# Patient Record
Sex: MALE | Race: WHITE | NOT HISPANIC OR LATINO | Employment: FULL TIME | URBAN - METROPOLITAN AREA
[De-identification: names, ages, dates, MRNs, and addresses within clinical notes are randomized per-mention and may not be internally consistent; named-entity substitution may affect disease eponyms.]

---

## 2017-01-06 ENCOUNTER — THERAPY VISIT (OUTPATIENT)
Dept: PHYSICAL THERAPY | Facility: CLINIC | Age: 52
End: 2017-01-06
Payer: COMMERCIAL

## 2017-01-06 DIAGNOSIS — M25.561 RIGHT KNEE PAIN: ICD-10-CM

## 2017-01-06 DIAGNOSIS — Z47.89 AFTERCARE FOLLOWING SURGERY OF THE MUSCULOSKELETAL SYSTEM: Primary | ICD-10-CM

## 2017-01-06 PROCEDURE — 97014 ELECTRIC STIMULATION THERAPY: CPT | Mod: GP | Performed by: PHYSICAL THERAPIST

## 2017-01-06 PROCEDURE — 97140 MANUAL THERAPY 1/> REGIONS: CPT | Mod: GP | Performed by: PHYSICAL THERAPIST

## 2017-01-06 PROCEDURE — 97110 THERAPEUTIC EXERCISES: CPT | Mod: GP | Performed by: PHYSICAL THERAPIST

## 2017-01-13 ENCOUNTER — THERAPY VISIT (OUTPATIENT)
Dept: PHYSICAL THERAPY | Facility: CLINIC | Age: 52
End: 2017-01-13
Payer: COMMERCIAL

## 2017-01-13 DIAGNOSIS — M25.561 RIGHT KNEE PAIN: ICD-10-CM

## 2017-01-13 DIAGNOSIS — Z47.89 AFTERCARE FOLLOWING SURGERY OF THE MUSCULOSKELETAL SYSTEM: Primary | ICD-10-CM

## 2017-01-13 PROCEDURE — 97140 MANUAL THERAPY 1/> REGIONS: CPT | Mod: GP | Performed by: PHYSICAL THERAPIST

## 2017-01-13 PROCEDURE — 97110 THERAPEUTIC EXERCISES: CPT | Mod: GP | Performed by: PHYSICAL THERAPIST

## 2017-01-19 ENCOUNTER — THERAPY VISIT (OUTPATIENT)
Dept: PHYSICAL THERAPY | Facility: CLINIC | Age: 52
End: 2017-01-19
Payer: COMMERCIAL

## 2017-01-19 DIAGNOSIS — Z47.89 AFTERCARE FOLLOWING SURGERY OF THE MUSCULOSKELETAL SYSTEM: Primary | ICD-10-CM

## 2017-01-19 DIAGNOSIS — M25.561 RIGHT KNEE PAIN: ICD-10-CM

## 2017-01-19 PROCEDURE — 97110 THERAPEUTIC EXERCISES: CPT | Mod: GP | Performed by: PHYSICAL THERAPIST

## 2017-01-19 PROCEDURE — 97140 MANUAL THERAPY 1/> REGIONS: CPT | Mod: GP | Performed by: PHYSICAL THERAPIST

## 2017-01-27 ENCOUNTER — THERAPY VISIT (OUTPATIENT)
Dept: PHYSICAL THERAPY | Facility: CLINIC | Age: 52
End: 2017-01-27
Payer: COMMERCIAL

## 2017-01-27 DIAGNOSIS — M25.561 RIGHT KNEE PAIN: ICD-10-CM

## 2017-01-27 DIAGNOSIS — Z47.89 AFTERCARE FOLLOWING SURGERY OF THE MUSCULOSKELETAL SYSTEM: Primary | ICD-10-CM

## 2017-01-27 PROCEDURE — 97140 MANUAL THERAPY 1/> REGIONS: CPT | Mod: GP | Performed by: PHYSICAL THERAPIST

## 2017-01-27 PROCEDURE — 97014 ELECTRIC STIMULATION THERAPY: CPT | Mod: GP | Performed by: PHYSICAL THERAPIST

## 2017-01-27 PROCEDURE — 97110 THERAPEUTIC EXERCISES: CPT | Mod: GP | Performed by: PHYSICAL THERAPIST

## 2017-01-27 NOTE — Clinical Note
[facility] ATHLETIC [facility] PHYSICAL THERAPY  [address]  [facility] [address]  [phone]    2017    Re: Miguel Moya   :   1965  MRN:  [id number]   REFERRING PHYSICIAN:   Lexa Calles    [facility] ATHLETIC [facility] PHYSICAL [facility]  Date of Initial Evaluation:  2016  Visits:     Reason for Referral:     Aftercare following surgery of the musculoskeletal system  Right knee pain  PROGRESS  REPORT  Progress reporting period is  to 2017.     SUBJECTIVE  Subjective changes noted by patient:  .  Subjective: Pt notes that he has minimal symptoms of pain. He is less guarded with ROM.     Current pain level is NA  .     Previous pain level was  NA  .   Changes in function:  Yes (See Goal flowsheet attached for changes in current functional level)  Adverse reaction to treatment or activity: None  OBJECTIVE  Changes noted in objective findings:  Yes,   Objective: PROM L knee extension supine 0, flexion sitting 90. Good quad activation,    ASSESSMENT/PLAN  Updated problem list and treatment plan: Diagnosis 1:  S/P meniscus repair  Decreased ROM/flexibility - manual therapy and therapeutic exercise  Decreased strength - therapeutic exercise and therapeutic activities  Impaired gait - gait training  Impaired muscle performance - neuro re-education  Decreased function - therapeutic activities  STG/LTGs have been met or progress has been made towards goals:  Yes (See Goal flow sheet completed today.)  Assessment of Progress: The patient's condition is improving.  Self Management Plans:  Patient has been instructed in a home treatment program.  I have re-evaluated this patient and find that the nature, scope, duration and intensity of the therapy is appropriate for the medical condition of the patient.  Miguel continues to require the following intervention to meet STG and LTG's:  PT  Recommendations:  Please advise on progression of program    Thank you  for your referral.        Re: Miguel Moya   :   1965    INQUIRIES  Therapist: Anahi Hardy PT, [facility]   [facility] [place] [facility]ICAL [facility]  [address] [place] [address] [id number]  [facility] [address]  Phone: [phone]  Fax: [phone]

## 2017-01-30 NOTE — PROGRESS NOTES
Subjective:    HPI                    Objective:    System    Physical Exam    General     ROS    Assessment/Plan:      PROGRESS  REPORT    Progress reporting period is  to 1-.       SUBJECTIVE  Subjective changes noted by patient:  .  Subjective: Pt notes that he has minimal symptoms of pain. He is less guarded with ROM.     Current pain level is NA  .     Previous pain level was  NA  .   Changes in function:  Yes (See Goal flowsheet attached for changes in current functional level)  Adverse reaction to treatment or activity: None    OBJECTIVE  Changes noted in objective findings:  Yes,   Objective: PROM L knee extension supine 0, flexion sitting 90. Good quad activation,      ASSESSMENT/PLAN  Updated problem list and treatment plan: Diagnosis 1:  S/P meniscus repair  Decreased ROM/flexibility - manual therapy and therapeutic exercise  Decreased strength - therapeutic exercise and therapeutic activities  Impaired gait - gait training  Impaired muscle performance - neuro re-education  Decreased function - therapeutic activities  STG/LTGs have been met or progress has been made towards goals:  Yes (See Goal flow sheet completed today.)  Assessment of Progress: The patient's condition is improving.  Self Management Plans:  Patient has been instructed in a home treatment program.  I have re-evaluated this patient and find that the nature, scope, duration and intensity of the therapy is appropriate for the medical condition of the patient.  Miguel continues to require the following intervention to meet STG and LTG's:  PT    Recommendations:  Please advise on progression of program    Please refer to the daily flowsheet for treatment today, total treatment time and time spent performing 1:1 timed codes.

## 2017-02-06 ENCOUNTER — THERAPY VISIT (OUTPATIENT)
Dept: PHYSICAL THERAPY | Facility: CLINIC | Age: 52
End: 2017-02-06
Payer: COMMERCIAL

## 2017-02-06 DIAGNOSIS — Z47.89 AFTERCARE FOLLOWING SURGERY OF THE MUSCULOSKELETAL SYSTEM: Primary | ICD-10-CM

## 2017-02-06 DIAGNOSIS — M25.561 RIGHT KNEE PAIN: ICD-10-CM

## 2017-02-06 PROCEDURE — 97110 THERAPEUTIC EXERCISES: CPT | Mod: GP | Performed by: PHYSICAL THERAPIST

## 2017-02-06 PROCEDURE — 97112 NEUROMUSCULAR REEDUCATION: CPT | Mod: GP | Performed by: PHYSICAL THERAPIST

## 2017-02-06 PROCEDURE — 97140 MANUAL THERAPY 1/> REGIONS: CPT | Mod: GP | Performed by: PHYSICAL THERAPIST

## 2017-02-13 ENCOUNTER — THERAPY VISIT (OUTPATIENT)
Dept: PHYSICAL THERAPY | Facility: CLINIC | Age: 52
End: 2017-02-13
Payer: COMMERCIAL

## 2017-02-13 DIAGNOSIS — Z47.89 AFTERCARE FOLLOWING SURGERY OF THE MUSCULOSKELETAL SYSTEM: ICD-10-CM

## 2017-02-13 DIAGNOSIS — M25.561 RIGHT KNEE PAIN: ICD-10-CM

## 2017-02-13 PROCEDURE — 97140 MANUAL THERAPY 1/> REGIONS: CPT | Mod: GP | Performed by: PHYSICAL THERAPIST

## 2017-02-13 PROCEDURE — 97110 THERAPEUTIC EXERCISES: CPT | Mod: GP | Performed by: PHYSICAL THERAPIST

## 2017-02-13 PROCEDURE — 97112 NEUROMUSCULAR REEDUCATION: CPT | Mod: GP | Performed by: PHYSICAL THERAPIST

## 2017-02-20 ENCOUNTER — THERAPY VISIT (OUTPATIENT)
Dept: PHYSICAL THERAPY | Facility: CLINIC | Age: 52
End: 2017-02-20
Payer: COMMERCIAL

## 2017-02-20 DIAGNOSIS — Z47.89 AFTERCARE FOLLOWING SURGERY OF THE MUSCULOSKELETAL SYSTEM: ICD-10-CM

## 2017-02-20 DIAGNOSIS — M25.561 RIGHT KNEE PAIN: ICD-10-CM

## 2017-02-20 PROCEDURE — 97110 THERAPEUTIC EXERCISES: CPT | Mod: GP | Performed by: PHYSICAL THERAPIST

## 2017-02-20 PROCEDURE — 97112 NEUROMUSCULAR REEDUCATION: CPT | Mod: GP | Performed by: PHYSICAL THERAPIST

## 2017-02-20 PROCEDURE — 97140 MANUAL THERAPY 1/> REGIONS: CPT | Mod: GP | Performed by: PHYSICAL THERAPIST

## 2017-02-27 ENCOUNTER — THERAPY VISIT (OUTPATIENT)
Dept: PHYSICAL THERAPY | Facility: CLINIC | Age: 52
End: 2017-02-27
Payer: COMMERCIAL

## 2017-02-27 DIAGNOSIS — Z47.89 AFTERCARE FOLLOWING SURGERY OF THE MUSCULOSKELETAL SYSTEM: ICD-10-CM

## 2017-02-27 DIAGNOSIS — M25.561 RIGHT KNEE PAIN: ICD-10-CM

## 2017-02-27 PROCEDURE — 97140 MANUAL THERAPY 1/> REGIONS: CPT | Mod: GP | Performed by: PHYSICAL THERAPIST

## 2017-02-27 PROCEDURE — 97112 NEUROMUSCULAR REEDUCATION: CPT | Mod: GP | Performed by: PHYSICAL THERAPIST

## 2017-02-27 PROCEDURE — 97110 THERAPEUTIC EXERCISES: CPT | Mod: GP | Performed by: PHYSICAL THERAPIST

## 2017-03-06 ENCOUNTER — THERAPY VISIT (OUTPATIENT)
Dept: PHYSICAL THERAPY | Facility: CLINIC | Age: 52
End: 2017-03-06
Payer: COMMERCIAL

## 2017-03-06 DIAGNOSIS — M25.561 RIGHT KNEE PAIN: ICD-10-CM

## 2017-03-06 DIAGNOSIS — Z47.89 AFTERCARE FOLLOWING SURGERY OF THE MUSCULOSKELETAL SYSTEM: ICD-10-CM

## 2017-03-06 PROCEDURE — 97110 THERAPEUTIC EXERCISES: CPT | Mod: GP | Performed by: PHYSICAL THERAPIST

## 2017-03-06 PROCEDURE — 97140 MANUAL THERAPY 1/> REGIONS: CPT | Mod: GP | Performed by: PHYSICAL THERAPIST

## 2017-03-06 PROCEDURE — 97112 NEUROMUSCULAR REEDUCATION: CPT | Mod: GP | Performed by: PHYSICAL THERAPIST

## 2017-03-06 NOTE — MR AVS SNAPSHOT
"              After Visit Summary   3/6/2017    Miguel Moya    MRN: [id number]           Patient Information     Date Of Birth          1965        Visit Information        Provider Department      3/6/2017 4:30 PM Anahi Hardy, PT [place] [facility]        Today's Diagnoses     Aftercare following surgery of the musculoskeletal system        Right knee pain           Follow-ups after your visit        Your next 10 appointments already scheduled     Mar 13, 2017  8:00 AM CDT   AL[facility] Extremity with Anahi Hardy PT   [facility] (AL[facility] [place]  )    [address]  [facility] [place] [id number] [phone]              Who to contact     If you have questions or need follow up information about today's clinic visit or your schedule please contact [place] [facility]AP[facility] directly at [phone].  Normal or non-critical lab and imaging results will be communicated to you by Blooiehart, letter or phone within 4 business days after the clinic has received the results. If you do not hear from us within 7 days, please contact the clinic through Blooiehart or phone. If you have a critical or abnormal lab result, we will notify you by phone as soon as possible.  Submit refill requests through Hemarina or call your pharmacy and they will forward the refill request to us. Please allow 3 business days for your refill to be completed.          Additional Information About Your Visit        Blooiehart Information     Hemarina lets you send messages to your doctor, view your test results, renew your prescriptions, schedule appointments and more. To sign up, go to [email or web address] . Click on \"Log in\" on the left side of the screen, which will take you to the Welcome page. Then click on \"Sign up Now\" on the right side of the page.     You will be asked to enter the access code listed below, as well " as some personal information. Please follow the directions to create your username and password.     Your access code is: [email or web address]  Expires: 2017  4:08 PM     Your access code will  in 90 days. If you need help or a new code, please call your [place] clinic or [phone].        Care EveryWhere ID     This is your Care EveryWhere ID. This could be used by other organizations to access your [place] medical records  [id number]         Blood Pressure from Last 3 Encounters:   12/10/15 143/89   13 100/80    Weight from Last 3 Encounters:   12/10/15 57.2 kg (126 lb)              We Performed the Following     [facility] PROGRESS NOTES REPORT     MANUAL THER TECH,1+REGIONS,EA 15 MIN     NEUROMUSCULAR RE-EDUCATION     THERAPEUTIC EXERCISES        Primary Care Provider Office Phone # Fax #    Heber Gaetano Salazar [phone]       [facility] [address]    [facility] [address]        Thank you!     Thank you for choosing [place] [facility] PHYSICAL THERAPY  for your care. Our goal is always to provide you with excellent care. Hearing back from our patients is one way we can continue to improve our services. Please take a few minutes to complete the written survey that you may receive in the mail after your visit with us. Thank you!             Your Updated Medication List - Protect others around you: Learn how to safely use, store and throw away your medicines at www.disposemymeds.org.          This list is accurate as of: 3/6/17 11:59 PM.  Always use your most recent med list.                   Brand Name Dispense Instructions for use    FLUTICASONE PROPIONATE          LEVOTHYROXINE SODIUM          ZYRTEC ALLERGY PO

## 2017-03-06 NOTE — LETTER
[facility]  [address]  [facility] [address]  [phone]    2017    Re: Miguel Moya   :   1965  MRN:  [id number]   REFERRING PHYSICIAN:   Lexa Calles    [facility]  Date of Initial Evaluation:  2016  Visits:     Reason for Referral:     Aftercare following surgery of the musculoskeletal system  Right knee pain  PROGRESS  REPORT  Progress reporting period is to 3-6-2017.     SUBJECTIVE  Subjective changes noted by patient:  . Pt is progressing well with weight bearing. He has moderate distal hamstring tightness after sitting that limits end range knee ext with walking. Extensive gait training drills and  cues combined with manual treatment and use of Alter G antigravity treadmill have significantly improved gait pattern and his postural awareness during walking.      Changes in function:  Yes (See Goal flowsheet attached for changes in current functional level)  Adverse reaction to treatment or activity: None  OBJECTIVE  Changes noted in objective findings:  Yes,   Objective: PROM flexion seated 126, PROM ext 0 after treatment, pre rx PROM ext approx -3. Gait with dec knee ext just past mid stance, improved with focused cues.    ASSESSMENT/PLAN  Updated problem list and treatment plan: Diagnosis 1:  S/P meniscus repair  Pain -  hot/cold therapy  Decreased ROM/flexibility - manual therapy and therapeutic exercise  Decreased joint mobility - manual therapy and therapeutic exercise  Impaired gait - gait training  Impaired muscle performance - neuro re-education  Decreased function - therapeutic activities  STG/LTGs have been met or progress has been made towards goals:  Yes (See Goal flow sheet completed today.)  Assessment of Progress: The patient's condition is improving.  Self Management Plans:  Patient has been instructed in a home treatment program.  I have re-evaluated this  patient and find that the nature, scope, duration and intensity of the therapy is appropriate for the medical condition of the patient.  Miguel continues to require the following intervention to meet STG and LTG's:  PT  Recommendations:  Please advise on protocol progression     Thank you for your referral.  Re: Miguel Moya   :   1965    INQUIRIES  Therapist: Anahi Hardy PT, [facility]   [facility] [place] PHYSICAL THERAPY  [address] [place] [address]  Phone: [phone]  Fax: [phone]

## 2017-03-08 NOTE — PROGRESS NOTES
Subjective:    HPI                    Objective:    System    Physical Exam    General     ROS    Assessment/Plan:      PROGRESS  REPORT    Progress reporting period is to 3-6-2017.       SUBJECTIVE  Subjective changes noted by patient:  . Pt is progressing well with weight bearing. He has moderate distal hamstring tightness after sitting that limits end range knee ext with walking. Extensive gait training drills and  cues combined with manual treatment and use of Alter G antigravity treadmill have significantly improved gait pattern and his postural awareness during walking.        Changes in function:  Yes (See Goal flowsheet attached for changes in current functional level)  Adverse reaction to treatment or activity: None    OBJECTIVE  Changes noted in objective findings:  Yes,   Objective: PROM flexion seated 126, PROM ext 0 after treatment, pre rx PROM ext approx -3. Gait with dec knee ext just past mid stance, improved with focused cues.      ASSESSMENT/PLAN  Updated problem list and treatment plan: Diagnosis 1:  S/P meniscus repair  Pain -  hot/cold therapy  Decreased ROM/flexibility - manual therapy and therapeutic exercise  Decreased joint mobility - manual therapy and therapeutic exercise  Impaired gait - gait training  Impaired muscle performance - neuro re-education  Decreased function - therapeutic activities  STG/LTGs have been met or progress has been made towards goals:  Yes (See Goal flow sheet completed today.)  Assessment of Progress: The patient's condition is improving.  Self Management Plans:  Patient has been instructed in a home treatment program.  I have re-evaluated this patient and find that the nature, scope, duration and intensity of the therapy is appropriate for the medical condition of the patient.  Miguel continues to require the following intervention to meet STG and LTG's:  PT    Recommendations:  Please advise on protocol progression     Please refer to the daily flowsheet for  treatment today, total treatment time and time spent performing 1:1 timed codes.

## 2017-03-13 ENCOUNTER — THERAPY VISIT (OUTPATIENT)
Dept: PHYSICAL THERAPY | Facility: CLINIC | Age: 52
End: 2017-03-13
Payer: COMMERCIAL

## 2017-03-13 DIAGNOSIS — M25.561 RIGHT KNEE PAIN: ICD-10-CM

## 2017-03-13 DIAGNOSIS — Z47.89 AFTERCARE FOLLOWING SURGERY OF THE MUSCULOSKELETAL SYSTEM: ICD-10-CM

## 2017-03-13 PROCEDURE — 97140 MANUAL THERAPY 1/> REGIONS: CPT | Mod: GP | Performed by: PHYSICAL THERAPIST

## 2017-03-13 PROCEDURE — 97112 NEUROMUSCULAR REEDUCATION: CPT | Mod: GP | Performed by: PHYSICAL THERAPIST

## 2017-03-13 PROCEDURE — 97110 THERAPEUTIC EXERCISES: CPT | Mod: GP | Performed by: PHYSICAL THERAPIST

## 2017-03-20 ENCOUNTER — THERAPY VISIT (OUTPATIENT)
Dept: PHYSICAL THERAPY | Facility: CLINIC | Age: 52
End: 2017-03-20
Payer: COMMERCIAL

## 2017-03-20 DIAGNOSIS — M25.561 RIGHT KNEE PAIN: ICD-10-CM

## 2017-03-20 DIAGNOSIS — Z47.89 AFTERCARE FOLLOWING SURGERY OF THE MUSCULOSKELETAL SYSTEM: ICD-10-CM

## 2017-03-20 PROCEDURE — 97110 THERAPEUTIC EXERCISES: CPT | Mod: GP | Performed by: PHYSICAL THERAPIST

## 2017-03-20 PROCEDURE — 97140 MANUAL THERAPY 1/> REGIONS: CPT | Mod: GP | Performed by: PHYSICAL THERAPIST

## 2017-03-20 PROCEDURE — 97112 NEUROMUSCULAR REEDUCATION: CPT | Mod: GP | Performed by: PHYSICAL THERAPIST

## 2017-03-27 ENCOUNTER — THERAPY VISIT (OUTPATIENT)
Dept: PHYSICAL THERAPY | Facility: CLINIC | Age: 52
End: 2017-03-27
Payer: COMMERCIAL

## 2017-03-27 DIAGNOSIS — Z47.89 AFTERCARE FOLLOWING SURGERY OF THE MUSCULOSKELETAL SYSTEM: ICD-10-CM

## 2017-03-27 DIAGNOSIS — M25.561 RIGHT KNEE PAIN: ICD-10-CM

## 2017-03-27 PROCEDURE — 97140 MANUAL THERAPY 1/> REGIONS: CPT | Mod: GP | Performed by: PHYSICAL THERAPIST

## 2017-03-27 PROCEDURE — 97112 NEUROMUSCULAR REEDUCATION: CPT | Mod: GP | Performed by: PHYSICAL THERAPIST

## 2017-03-27 PROCEDURE — 97110 THERAPEUTIC EXERCISES: CPT | Mod: GP | Performed by: PHYSICAL THERAPIST

## 2017-04-13 ENCOUNTER — THERAPY VISIT (OUTPATIENT)
Dept: PHYSICAL THERAPY | Facility: CLINIC | Age: 52
End: 2017-04-13
Payer: COMMERCIAL

## 2017-04-13 DIAGNOSIS — M25.561 RIGHT KNEE PAIN: ICD-10-CM

## 2017-04-13 DIAGNOSIS — Z47.89 AFTERCARE FOLLOWING SURGERY OF THE MUSCULOSKELETAL SYSTEM: ICD-10-CM

## 2017-04-13 PROCEDURE — 97112 NEUROMUSCULAR REEDUCATION: CPT | Mod: GP | Performed by: PHYSICAL THERAPIST

## 2017-04-13 PROCEDURE — 97110 THERAPEUTIC EXERCISES: CPT | Mod: GP | Performed by: PHYSICAL THERAPIST

## 2017-04-13 PROCEDURE — 97140 MANUAL THERAPY 1/> REGIONS: CPT | Mod: GP | Performed by: PHYSICAL THERAPIST

## 2017-04-13 NOTE — LETTER
[facility] ATHLETIC [facility]YSICAL [facility]ERAPY  [address]  [facility] [address]  [phone]    2017    Re: Miguel Moya   :   1965  MRN:  [id number]   REFERRING PHYSICIAN:   Lexa Calles    [facility]  Date of Initial Evaluation:  2016  Visits:     Reason for Referral:     Aftercare following surgery of the musculoskeletal system  Right knee pain  PROGRESS  REPORT  Progress reporting period is  to 2017.     SUBJECTIVE  Subjective changes noted by patient. Pt notes less stiffness overall when moving sit to stand for work. He has had some return of groin and abdominal area pain similar to prior sx. He will closely look at bike saddle type as he has biked more and did not have return of those symtpoms until then. He has done water running.     }  .   Changes in function:  Yes (See Goal flowsheet attached for changes in current functional level)  Adverse reaction to treatment or activity: None  OBJECTIVE  Changes noted in objective findings:  Yes,   Objective: PROM flexion seated 135, supine knee flexion limited with hip flex 90- difficult for pt to relax , poss hip mobility loss dec knee flexion in that positon.    Prox R gastroc area of primary soft tissue restriction. PROM knee ext +1 post rx. Gait with min dec R knee ext, Squat with equal WTB with cues. Improving but ongoing dec R thigh girth.    ASSESSMENT/PLAN  Updated problem list and treatment plan: Diagnosis 1:  S/P meniscus repair  Decreased ROM/flexibility - manual therapy and therapeutic exercise  Decreased strength - therapeutic exercise and therapeutic activities  Impaired gait - gait training  Impaired muscle performance - neuro re-education  STG/LTGs have been met or progress has been made towards goals:  Yes (See Goal flow sheet completed today.)  Assessment of Progress: The patient's condition is improving.  The patient's condition has  potential to improve.  Self Management Plans:  Patient has been instructed in a home treatment program.  I have re-evaluated this patient and find that the nature, scope, duration and intensity of the therapy is appropriate for the medical condition of the patient.  Miguel continues to require the following intervention to meet STG and LTG's:  PT        Re: Miguel Moya   :   1965    Recommendations:  This patient would benefit from continued therapy.     Frequency:  1 X week, once daily  Duration:  for 3 weeks    Thank you for your referral.    INQUIRIES  Therapist: Anahi Hardy PT, [facility]   [facility] [place] PHYSICAL THERAPY  [address] [place] [address] [id number] [address]  Phone: [phone]  Fax: [phone]

## 2017-04-16 NOTE — PROGRESS NOTES
Subjective:    HPI                    Objective:    System    Physical Exam    General     ROS    Assessment/Plan:      PROGRESS  REPORT    Progress reporting period is  to 4-.       SUBJECTIVE  Subjective changes noted by patient. Pt notes less stiffness overall when moving sit to stand for work. He has had some return of groin and abdominal area pain similar to prior sx. He will closely look at bike saddle type as he has biked more and did not have return of those symtpoms until then. He has done water running.     }  .   Changes in function:  Yes (See Goal flowsheet attached for changes in current functional level)  Adverse reaction to treatment or activity: None    OBJECTIVE  Changes noted in objective findings:  Yes,   Objective: PROM flexion seated 135, supine knee flexion limited with hip flex 90- difficult for pt to relax , poss hip mobility loss dec knee flexion in that positon.    Prox R gastroc area of primary soft tissue restriction. PROM knee ext +1 post rx. Gait with min dec R knee ext, Squat with equal WTB with cues. Improving but ongoing dec R thigh girth.      ASSESSMENT/PLAN  Updated problem list and treatment plan: Diagnosis 1:  S/P meniscus repair  Decreased ROM/flexibility - manual therapy and therapeutic exercise  Decreased strength - therapeutic exercise and therapeutic activities  Impaired gait - gait training  Impaired muscle performance - neuro re-education  STG/LTGs have been met or progress has been made towards goals:  Yes (See Goal flow sheet completed today.)  Assessment of Progress: The patient's condition is improving.  The patient's condition has potential to improve.  Self Management Plans:  Patient has been instructed in a home treatment program.  I have re-evaluated this patient and find that the nature, scope, duration and intensity of the therapy is appropriate for the medical condition of the patient.  Miguel continues to require the following intervention to meet STG and  LTG's:  PT    Recommendations:  This patient would benefit from continued therapy.     Frequency:  1 X week, once daily  Duration:  for 3 weeks        Please refer to the daily flowsheet for treatment today, total treatment time and time spent performing 1:1 timed codes.

## 2017-04-24 ENCOUNTER — THERAPY VISIT (OUTPATIENT)
Dept: PHYSICAL THERAPY | Facility: CLINIC | Age: 52
End: 2017-04-24
Payer: COMMERCIAL

## 2017-04-24 DIAGNOSIS — M25.561 RIGHT KNEE PAIN: ICD-10-CM

## 2017-04-24 DIAGNOSIS — Z47.89 AFTERCARE FOLLOWING SURGERY OF THE MUSCULOSKELETAL SYSTEM: ICD-10-CM

## 2017-04-24 PROCEDURE — 97112 NEUROMUSCULAR REEDUCATION: CPT | Mod: GP | Performed by: PHYSICAL THERAPIST

## 2017-04-24 PROCEDURE — 97140 MANUAL THERAPY 1/> REGIONS: CPT | Mod: GP | Performed by: PHYSICAL THERAPIST

## 2017-04-24 PROCEDURE — 97110 THERAPEUTIC EXERCISES: CPT | Mod: GP | Performed by: PHYSICAL THERAPIST

## 2017-05-08 ENCOUNTER — THERAPY VISIT (OUTPATIENT)
Dept: PHYSICAL THERAPY | Facility: CLINIC | Age: 52
End: 2017-05-08
Payer: COMMERCIAL

## 2017-05-08 DIAGNOSIS — M25.561 RIGHT KNEE PAIN: ICD-10-CM

## 2017-05-08 DIAGNOSIS — Z47.89 AFTERCARE FOLLOWING SURGERY OF THE MUSCULOSKELETAL SYSTEM: ICD-10-CM

## 2017-05-08 PROCEDURE — 97112 NEUROMUSCULAR REEDUCATION: CPT | Mod: GP | Performed by: PHYSICAL THERAPIST

## 2017-05-08 PROCEDURE — 97110 THERAPEUTIC EXERCISES: CPT | Mod: GP | Performed by: PHYSICAL THERAPIST

## 2017-05-08 PROCEDURE — 97140 MANUAL THERAPY 1/> REGIONS: CPT | Mod: GP | Performed by: PHYSICAL THERAPIST

## 2017-05-26 ENCOUNTER — THERAPY VISIT (OUTPATIENT)
Dept: PHYSICAL THERAPY | Facility: CLINIC | Age: 52
End: 2017-05-26
Payer: COMMERCIAL

## 2017-05-26 DIAGNOSIS — M25.561 RIGHT KNEE PAIN: ICD-10-CM

## 2017-05-26 DIAGNOSIS — Z47.89 AFTERCARE FOLLOWING SURGERY OF THE MUSCULOSKELETAL SYSTEM: ICD-10-CM

## 2017-05-26 PROCEDURE — 97112 NEUROMUSCULAR REEDUCATION: CPT | Mod: GP | Performed by: PHYSICAL THERAPIST

## 2017-05-26 PROCEDURE — 97110 THERAPEUTIC EXERCISES: CPT | Mod: GP | Performed by: PHYSICAL THERAPIST

## 2017-05-26 PROCEDURE — 97140 MANUAL THERAPY 1/> REGIONS: CPT | Mod: GP | Performed by: PHYSICAL THERAPIST

## 2017-06-19 ENCOUNTER — THERAPY VISIT (OUTPATIENT)
Dept: PHYSICAL THERAPY | Facility: CLINIC | Age: 52
End: 2017-06-19
Payer: COMMERCIAL

## 2017-06-19 DIAGNOSIS — Z47.89 AFTERCARE FOLLOWING SURGERY OF THE MUSCULOSKELETAL SYSTEM: ICD-10-CM

## 2017-06-19 DIAGNOSIS — M25.561 RIGHT KNEE PAIN: ICD-10-CM

## 2017-06-19 PROCEDURE — 97112 NEUROMUSCULAR REEDUCATION: CPT | Mod: GP | Performed by: PHYSICAL THERAPIST

## 2017-06-19 PROCEDURE — 97110 THERAPEUTIC EXERCISES: CPT | Mod: GP | Performed by: PHYSICAL THERAPIST

## 2017-06-19 PROCEDURE — 97140 MANUAL THERAPY 1/> REGIONS: CPT | Mod: GP | Performed by: PHYSICAL THERAPIST

## 2017-07-10 ENCOUNTER — THERAPY VISIT (OUTPATIENT)
Dept: PHYSICAL THERAPY | Facility: CLINIC | Age: 52
End: 2017-07-10
Payer: COMMERCIAL

## 2017-07-10 DIAGNOSIS — M25.561 RIGHT KNEE PAIN: ICD-10-CM

## 2017-07-10 DIAGNOSIS — Z47.89 AFTERCARE FOLLOWING SURGERY OF THE MUSCULOSKELETAL SYSTEM: ICD-10-CM

## 2017-07-10 PROCEDURE — 97110 THERAPEUTIC EXERCISES: CPT | Mod: GP | Performed by: PHYSICAL THERAPIST

## 2017-07-10 PROCEDURE — 97140 MANUAL THERAPY 1/> REGIONS: CPT | Mod: GP | Performed by: PHYSICAL THERAPIST

## 2017-10-23 ENCOUNTER — THERAPY VISIT (OUTPATIENT)
Dept: PHYSICAL THERAPY | Facility: CLINIC | Age: 52
End: 2017-10-23
Payer: COMMERCIAL

## 2017-10-23 DIAGNOSIS — M25.561 RIGHT KNEE PAIN: ICD-10-CM

## 2017-10-23 DIAGNOSIS — Z47.89 AFTERCARE FOLLOWING SURGERY OF THE MUSCULOSKELETAL SYSTEM: ICD-10-CM

## 2017-10-23 PROCEDURE — 97140 MANUAL THERAPY 1/> REGIONS: CPT | Mod: GP | Performed by: PHYSICAL THERAPIST

## 2017-10-23 PROCEDURE — 97110 THERAPEUTIC EXERCISES: CPT | Mod: GP | Performed by: PHYSICAL THERAPIST

## 2017-11-22 ENCOUNTER — THERAPY VISIT (OUTPATIENT)
Dept: PHYSICAL THERAPY | Facility: CLINIC | Age: 52
End: 2017-11-22
Payer: COMMERCIAL

## 2017-11-22 DIAGNOSIS — M25.561 RIGHT KNEE PAIN: ICD-10-CM

## 2017-11-22 DIAGNOSIS — Z47.89 AFTERCARE FOLLOWING SURGERY OF THE MUSCULOSKELETAL SYSTEM: ICD-10-CM

## 2017-11-22 PROCEDURE — 97110 THERAPEUTIC EXERCISES: CPT | Mod: GP | Performed by: PHYSICAL THERAPIST

## 2017-11-22 PROCEDURE — 97140 MANUAL THERAPY 1/> REGIONS: CPT | Mod: GP | Performed by: PHYSICAL THERAPIST

## 2018-04-06 ENCOUNTER — THERAPY VISIT (OUTPATIENT)
Dept: PHYSICAL THERAPY | Facility: CLINIC | Age: 53
End: 2018-04-06
Payer: COMMERCIAL

## 2018-04-06 DIAGNOSIS — M25.561 RIGHT KNEE PAIN: Primary | ICD-10-CM

## 2018-04-06 PROCEDURE — 97110 THERAPEUTIC EXERCISES: CPT | Mod: GP | Performed by: PHYSICAL THERAPIST

## 2018-04-06 PROCEDURE — 97161 PT EVAL LOW COMPLEX 20 MIN: CPT | Mod: GP | Performed by: PHYSICAL THERAPIST

## 2018-04-06 ASSESSMENT — ACTIVITIES OF DAILY LIVING (ADL)
SIT WITH YOUR KNEE BENT: ACTIVITY IS MINIMALLY DIFFICULT
KNEE_ACTIVITY_OF_DAILY_LIVING_SUM: 66
WEAKNESS: I DO NOT HAVE THE SYMPTOM
RISE FROM A CHAIR: ACTIVITY IS NOT DIFFICULT
HOW_WOULD_YOU_RATE_THE_CURRENT_FUNCTION_OF_YOUR_KNEE_DURING_YOUR_USUAL_DAILY_ACTIVITIES_ON_A_SCALE_FROM_0_TO_100_WITH_100_BEING_YOUR_LEVEL_OF_KNEE_FUNCTION_PRIOR_TO_YOUR_INJURY_AND_0_BEING_THE_INABILITY_TO_PERFORM_ANY_OF_YOUR_USUAL_DAILY_ACTIVITIES?: 100
GO UP STAIRS: ACTIVITY IS NOT DIFFICULT
WALK: ACTIVITY IS NOT DIFFICULT
STAND: ACTIVITY IS NOT DIFFICULT
STIFFNESS: I HAVE THE SYMPTOM BUT IT DOES NOT AFFECT MY ACTIVITY
KNEE_ACTIVITY_OF_DAILY_LIVING_SCORE: 94.29
PAIN: I HAVE THE SYMPTOM BUT IT DOES NOT AFFECT MY ACTIVITY
RAW_SCORE: 66
SWELLING: I DO NOT HAVE THE SYMPTOM
LIMPING: I DO NOT HAVE THE SYMPTOM
GO DOWN STAIRS: ACTIVITY IS NOT DIFFICULT
SQUAT: ACTIVITY IS MINIMALLY DIFFICULT
HOW_WOULD_YOU_RATE_THE_OVERALL_FUNCTION_OF_YOUR_KNEE_DURING_YOUR_USUAL_DAILY_ACTIVITIES?: NORMAL
KNEEL ON THE FRONT OF YOUR KNEE: ACTIVITY IS NOT DIFFICULT
AS_A_RESULT_OF_YOUR_KNEE_INJURY,_HOW_WOULD_YOU_RATE_YOUR_CURRENT_LEVEL_OF_DAILY_ACTIVITY?: NORMAL
GIVING WAY, BUCKLING OR SHIFTING OF KNEE: I DO NOT HAVE THE SYMPTOM

## 2018-04-06 NOTE — LETTER
[facility] ATHLETIC [facility] PHYSICAL THERAPY  [address]  [facility] [address]  [phone]    2018    Re: Miguel Moya   :   1965  MRN:  [id number]   REFERRING PHYSICIAN:   Lexa Calles    [facility] ATHLETIC [facility] PHYSICAL [facility]  Date of Initial Evaluation: 2018  Visits:  1 Rxs Used: 1  Reason for Referral:  Right knee pain  EVALUATION SUMMARY  [facility] Initial Evaluation  Subjective:   Pt reports ongoing and increase in R medial knee aching and tightness in R high hamstring with increased activity. He had meniscus repair Dec 2016. He has been running for about 5 weeks with 3x/week 5 miles. Biking daily commute [address]. He is doing some yoga without issue. .  Patient reports pain:  Medial and posterior.  Radiates to:  Thigh.  Pain is described as aching (tightness) and is intermittent   Associated symptoms:  Loss of motion/stiffness and loss of strength. Pain is worse during the day.  Symptoms are exacerbated by bending/squatting, walking, ascending stairs and descending stairs and relieved by rest and ice (stretch can help).  Since onset symptoms are gradually worsening (as inc acivity is either unchanged or worsening slightly).    Previous treatment includes physical therapy (post op).  There was significant improvement following previous treatment.  General health as reported by patient is excellent.      Red flags:  None as reported by the patient. Knee Activity of Daily Living Score: 94.29          Objective:  Flexibility/Screens:   Positive screens:  Hip (mod loss ER , min loss IR with firm end feel R) and Lumbar (mod loss lumbar ext)  Lower Extremity:  Decreased right lower extremity flexibility:  Hamstrings  Knee Evaluation:  ROM:  Strength wnl knee: dec R quad girth and tone and definition,   PROM  Extension: Left:   Right:  -2  Endfeel: R knee ext medial distal HS restriction, R lateral distal quad  adn HS ITB area restricted, proximal medial gastroc dec mobility   Palpation:    Right knee tenderness present at:  Biceps Femoral and Semitendinosus  Mobility Testing:  Mobility testing: dec R knee ext, dec R hip ER/IR,   Functional Testing:    Quad:    Single Leg Squat:  Left:      Right:       Excessive anterior knee excursion, femoral IR and moderate loss of control  Bilateral Leg Squat:      Assessment/Plan:    Re: Miguel Moya   :   1965    Patient is a 52 year old male with right side knee complaints.    Patient has the following significant findings with corresponding treatment plan.                Diagnosis 1:  R knee pain  Pain -  hot/cold therapy, manual therapy and self management  Decreased ROM/flexibility - manual therapy and therapeutic exercise  Decreased joint mobility - manual therapy and therapeutic exercise  Decreased strength - therapeutic exercise and therapeutic activities  Decreased proprioception - neuro re-education and therapeutic activities  Impaired gait - gait training  Impaired muscle performance - neuro re-education  Decreased function - therapeutic activities  Therapy Evaluation Codes:   1) History comprised of:   Personal factors that impact the plan of care:      None.    Comorbidity factors that impact the plan of care are:      None.     Medications impacting care: None.  2) Examination of Body Systems comprised of:   Body structures and functions that impact the plan of care:      Knee.   Activity limitations that impact the plan of care are:      Squatting/kneeling and Stairs.  3) Clinical presentation characteristics are:   Stable/Uncomplicated.  4) Decision-Making    Low complexity using standardized patient assessment instrument and/or measureable assessment of functional outcome.  Cumulative Therapy Evaluation is: Low complexity.  Previous and current functional limitations:  (See Goal Flow Sheet for this information)    Short term and Long term goals: (See Goal  Flow Sheet for this information)   Communication ability:  Patient appears to be able to clearly communicate and understand verbal and written communication and follow directions correctly.  Treatment Explanation - The following has been discussed with the patient:   RX ordered/plan of care  Anticipated outcomes  Possible risks and side effects  This patient would benefit from PT intervention to resume normal activities.   Rehab potential is excellent.  Frequency:  1 X week, once daily  Duration:  for 6 weeks  Discharge Plan:  Achieve all LTG.  Independent in home treatment program.  Reach maximal therapeutic benefit.              Re: Miguel Moya   :   1965    Thank you for your referral.    INQUIRIES  Therapist: Anahi Hardy PT, [facility]   [facility] - [place] PHYSICAL THERAPY  [address] [place] [address] [address] [place] [address]  Phone: [phone]  Fax: [phone]

## 2018-04-06 NOTE — MR AVS SNAPSHOT
"              After Visit Summary   4/6/2018    Miguel Moya    MRN: [id number]           Patient Information     Date Of Birth          1965        Visit Information        Provider Department      4/6/2018 8:40 AM Anahi Hardy, PT [place] [facility] Physical Therapy        Today's Diagnoses     Right knee pain    -  1       Follow-ups after your visit        Your next 10 appointments already scheduled     May 07, 2018  5:10 PM CDT   ALTON Extremity with Anahi Hardy PT   [facility] Physical Therapy (ALTON Nikky  )    [address] [place] [address] [address]  [facility] [place] [address] [phone]              Who to contact     If you have questions or need follow up information about today's clinic visit or your schedule please contact [place] [facility] PH[facility]ICAL THERAP[facility] directly at [phone].  Normal or non-critical lab and imaging results will be communicated to you by SonoMedicahart, letter or phone within 4 business days after the clinic has received the results. If you do not hear from us within 7 days, please contact the clinic through SonoMedicahart or phone. If you have a critical or abnormal lab result, we will notify you by phone as soon as possible.  Submit refill requests through MiTu Network or call your pharmacy and they will forward the refill request to us. Please allow 3 business days for your refill to be completed.          Additional Information About Your Visit        MyChart Information     MiTu Network lets you send messages to your doctor, view your test results, renew your prescriptions, schedule appointments and more. To sign up, go to [email or web address] . Click on \"Log in\" on the left side of the screen, which will take you to the Welcome page. Then click on \"Sign up Now\" on the right side of the page.     You will be asked to enter the access code listed below, as well as some personal information. Please follow the directions to " create your username and password.     Your access code is: [email or web address]  Expires: 2018  1:41 PM     Your access code will  in 90 days. If you need help or a new code, please call your [facility] clinic or [phone].        Care EveryWhere ID     This is your Care EveryWhere ID. This could be used by other organizations to access your [facility] medical records  [id number]         Blood Pressure from Last 3 Encounters:   12/10/15 143/89   13 100/80    Weight from Last 3 Encounters:   12/10/15 57.2 kg (126 lb)              We Performed the Following     HC PT EVAL, LOW COMPLEXITY     ALTON INITIAL EVAL REPORT     THERAPEUTIC EXERCISES        Primary Care Provider Office Phone # Fax #    Ehber Gaetano Salazar [phone]       [facility] [address]    [facility] [address]        Equal Access to Services     JULY SAMANO AH: Hadii aad ku hadasho Soomaali, waaxda luqadaha, qaybta kaalmada adeegyada, waxay idiin hayaan radhaeg kandace lovell . So [facility] [phone].    ATENCIÓN: Si jo ann stovall, tiene a ortega disposición servicios gratuitos de asistencia lingüística. Llame al [phone].    We comply with applicable federal civil rights laws and [place] laws. We do not discriminate on the basis of race, color, national origin, age, disability, sex, sexual orientation, or gender identity.            Thank you!     Thank you for choosing [facility]  for your care. Our goal is always to provide you with excellent care. Hearing back from our patients is one way we can continue to improve our services. Please take a few minutes to complete the written survey that you may receive in the mail after your visit with us. Thank you!             Your Updated Medication List - Protect others around you: Learn how to safely use, store and throw away your medicines at www.disposemymeds.org.          This list is accurate as of  4/6/18 11:59 PM.  Always use your most recent med list.                   Brand Name Dispense Instructions for use Diagnosis    FLUTICASONE PROPIONATE           LEVOTHYROXINE SODIUM           ZYRTEC ALLERGY PO

## 2018-04-09 PROBLEM — G89.29 CHRONIC PAIN OF RIGHT KNEE: Status: ACTIVE | Noted: 2018-04-09

## 2018-04-09 PROBLEM — M25.561 CHRONIC PAIN OF RIGHT KNEE: Status: ACTIVE | Noted: 2018-04-09

## 2018-04-09 NOTE — PROGRESS NOTES
[place] [facility] Initial Evaluation  Subjective:  Patient is a 52 year old male presenting with rehab left ankle/foot hpi.          Knee Activity of Daily Living Score: 94.29            Objective:  System    Physical Exam    General     ROS    Assessment/Plan:

## 2018-04-09 NOTE — PROGRESS NOTES
[place] [facility] Initial Evaluation  Subjective:  Patient is a 52 year old male presenting with rehab right knee hpi.           Pt reports ongoing and increase in R medial knee aching and tightness in R high hamstring with increased activity. He had meniscus repair Dec 2016. He has been running for about 5 weeks with 3x/week 5 miles. Biking daily commute [address]. He is doing some yoga without issue. .    Patient reports pain:  Medial and posterior.  Radiates to:  Thigh.  Pain is described as aching (tightness) and is intermittent   Associated symptoms:  Loss of motion/stiffness and loss of strength. Pain is worse during the day.  Symptoms are exacerbated by bending/squatting, walking, ascending stairs and descending stairs and relieved by rest and ice (stretch can help).  Since onset symptoms are gradually worsening (as inc acivity is either unchanged or worsening slightly).    Previous treatment includes physical therapy (post op).  There was significant improvement following previous treatment.  General health as reported by patient is excellent.                      Red flags:  None as reported by the patient.           Knee Activity of Daily Living Score: 94.29            Objective:        Flexibility/Screens:   Positive screens:  Hip (mod loss ER , min loss IR with firm end feel R) and Lumbar (mod loss lumbar ext)    Lower Extremity:      Decreased right lower extremity flexibility:  Hamstrings                                                      Knee Evaluation:  ROM:  Strength wnl knee: dec R quad girth and tone and definition,     PROM      Extension: Left:   Right:  -2      Endfeel: R knee ext medial distal HS restriction, R lateral distal quad adn HS ITB area restricted, proximal medial gastroc dec mobility         Palpation:      Right knee tenderness present at:  Biceps Femoral and Semitendinosus    Mobility Testing:  Mobility testing: dec R knee ext, dec R hip ER/IR,              Functional Testing:          Quad:    Single Leg Squat:  Left:      Right:       Excessive anterior knee excursion, femoral IR and moderate loss of control  Bilateral Leg Squat:                General     ROS    Assessment/Plan:    Patient is a 52 year old male with right side knee complaints.    Patient has the following significant findings with corresponding treatment plan.                Diagnosis 1:  R knee pain  Pain -  hot/cold therapy, manual therapy and self management  Decreased ROM/flexibility - manual therapy and therapeutic exercise  Decreased joint mobility - manual therapy and therapeutic exercise  Decreased strength - therapeutic exercise and therapeutic activities  Decreased proprioception - neuro re-education and therapeutic activities  Impaired gait - gait training  Impaired muscle performance - neuro re-education  Decreased function - therapeutic activities    Therapy Evaluation Codes:   1) History comprised of:   Personal factors that impact the plan of care:      None.    Comorbidity factors that impact the plan of care are:      None.     Medications impacting care: None.  2) Examination of Body Systems comprised of:   Body structures and functions that impact the plan of care:      Knee.   Activity limitations that impact the plan of care are:      Squatting/kneeling and Stairs.  3) Clinical presentation characteristics are:   Stable/Uncomplicated.  4) Decision-Making    Low complexity using standardized patient assessment instrument and/or measureable assessment of functional outcome.  Cumulative Therapy Evaluation is: Low complexity.    Previous and current functional limitations:  (See Goal Flow Sheet for this information)    Short term and Long term goals: (See Goal Flow Sheet for this information)     Communication ability:  Patient appears to be able to clearly communicate and understand verbal and written communication and follow directions correctly.  Treatment Explanation  - The following has been discussed with the patient:   RX ordered/plan of care  Anticipated outcomes  Possible risks and side effects  This patient would benefit from PT intervention to resume normal activities.   Rehab potential is excellent.    Frequency:  1 X week, once daily  Duration:  for 6 weeks  Discharge Plan:  Achieve all LTG.  Independent in home treatment program.  Reach maximal therapeutic benefit.    Please refer to the daily flowsheet for treatment today, total treatment time and time spent performing 1:1 timed codes.

## 2018-05-07 ENCOUNTER — THERAPY VISIT (OUTPATIENT)
Dept: PHYSICAL THERAPY | Facility: CLINIC | Age: 53
End: 2018-05-07
Payer: COMMERCIAL

## 2018-05-07 DIAGNOSIS — M25.561 RIGHT KNEE PAIN: ICD-10-CM

## 2018-05-07 PROCEDURE — 97112 NEUROMUSCULAR REEDUCATION: CPT | Mod: GP | Performed by: PHYSICAL THERAPIST

## 2018-05-07 PROCEDURE — 97110 THERAPEUTIC EXERCISES: CPT | Mod: GP | Performed by: PHYSICAL THERAPIST

## 2018-05-07 PROCEDURE — 97140 MANUAL THERAPY 1/> REGIONS: CPT | Mod: GP | Performed by: PHYSICAL THERAPIST

## 2018-05-25 ENCOUNTER — THERAPY VISIT (OUTPATIENT)
Dept: PHYSICAL THERAPY | Facility: CLINIC | Age: 53
End: 2018-05-25
Payer: COMMERCIAL

## 2018-05-25 DIAGNOSIS — M25.561 RIGHT KNEE PAIN: ICD-10-CM

## 2018-05-25 PROCEDURE — 97110 THERAPEUTIC EXERCISES: CPT | Mod: GP | Performed by: PHYSICAL THERAPIST

## 2018-05-25 PROCEDURE — 97140 MANUAL THERAPY 1/> REGIONS: CPT | Mod: GP | Performed by: PHYSICAL THERAPIST

## 2018-06-12 ENCOUNTER — THERAPY VISIT (OUTPATIENT)
Dept: PHYSICAL THERAPY | Facility: CLINIC | Age: 53
End: 2018-06-12
Payer: COMMERCIAL

## 2018-06-12 DIAGNOSIS — M25.561 RIGHT KNEE PAIN: ICD-10-CM

## 2018-06-12 PROCEDURE — 97110 THERAPEUTIC EXERCISES: CPT | Mod: GP | Performed by: PHYSICAL THERAPIST

## 2018-06-12 PROCEDURE — 97035 APP MDLTY 1+ULTRASOUND EA 15: CPT | Mod: GP | Performed by: PHYSICAL THERAPIST

## 2018-06-12 PROCEDURE — 97140 MANUAL THERAPY 1/> REGIONS: CPT | Mod: GP | Performed by: PHYSICAL THERAPIST

## 2018-07-09 ENCOUNTER — THERAPY VISIT (OUTPATIENT)
Dept: PHYSICAL THERAPY | Facility: CLINIC | Age: 53
End: 2018-07-09
Payer: COMMERCIAL

## 2018-07-09 DIAGNOSIS — M25.561 RIGHT KNEE PAIN: ICD-10-CM

## 2018-07-09 PROCEDURE — 97140 MANUAL THERAPY 1/> REGIONS: CPT | Mod: GP | Performed by: PHYSICAL THERAPIST

## 2018-07-09 PROCEDURE — 97110 THERAPEUTIC EXERCISES: CPT | Mod: GP | Performed by: PHYSICAL THERAPIST

## 2018-10-24 ENCOUNTER — THERAPY VISIT (OUTPATIENT)
Dept: PHYSICAL THERAPY | Facility: CLINIC | Age: 53
End: 2018-10-24
Payer: COMMERCIAL

## 2018-10-24 DIAGNOSIS — M54.50 LOW BACK PAIN: Primary | ICD-10-CM

## 2018-10-24 PROCEDURE — 97161 PT EVAL LOW COMPLEX 20 MIN: CPT | Mod: GP | Performed by: PHYSICAL THERAPIST

## 2018-10-24 PROCEDURE — 97110 THERAPEUTIC EXERCISES: CPT | Mod: GP | Performed by: PHYSICAL THERAPIST

## 2018-10-24 NOTE — PROGRESS NOTES
[facility] Initial Evaluation  Subjective:  Patient is a 52 year old male presenting with rehab back hpi.           Pt reports onset of R glute/LB/sciatica pain slowly over six months- April 2018. He has most pain with sitting, car worse.  He had L knee pain with running and he feels he may have compensated for that increasing load to R. He had L knee MRI that was negative. .He does not have current symptoms with biking.   He has had hx of LB and LS pain previously rx with PT.  He had R meniscus repair successfully returned to activity of running and biking.  .    Patient reports pain:  Lumbar spine right.  Radiates to:  Gluteals right and thigh right.  Pain is described as shooting, aching and burning and is intermittent and reported as 1/10.  Associated symptoms:  Loss of motion. Pain is worse during the day.  Symptoms are exacerbated by bending, lifting and sitting Relieved by: change of position.  Since onset symptoms are unchanged.    Previous treatment includes physical therapy.  There was significant improvement following previous treatment.  General health as reported by patient is excellent.                      Red flags:  None as reported by the patient.                        Objective:  Standing Alignment:                  General deviations alignment: hips are shifted to the R, dec lumbar ext, dec L knee ext in standing,   Gait:  Dec push off R, dec stride length R, hip hike R, dec stance time L         Flexibility/Screens:   Positive screens:  Hip (L hip min loss ER IR with firm end feel , R hip firm end feel min loss ER) and Lumbar    Lower Extremity:  Decreased left lower extremity flexibility:Hip IR's and Hip ER's    Decreased right lower extremity flexibility:  Hip IR's; Hip ER's; Adductors and Hip Flexors               Lumbar/SI Evaluation  ROM:    AROM Lumbar:   Flexion:        Ext:                    Dec lumbar side glide with hips moving L, dec lumbar ext min loss   Side  Bend:        Left:     Right:   Rotation:           Left:     Right:   Side Glide:        Left:     Right:                   Neural Tension/Mobility:      Right side:   Slump positive.  Lumbar Palpation:    Tenderness present at Left:    Quadratus Lumborum and Erector Spinae  Tenderness present at Right: Quadratus Lumborum; Erector Spinae; Piriformis; Hamstrings and Hip Flexors  Tenderness not present at Right:  Greater Trochanter    Lumbar Provocation:      Right positive with: Stork w/ext  Spinal Segmental Conclusions:     Level: Hypo noted at S1 and L5                                                   General     ROS    Assessment/Plan:    Patient is a 52 year old male with lumbar complaints.    Patient has the following significant findings with corresponding treatment plan.                Diagnosis 1:  Sciatica   Pain -  hot/cold therapy, manual therapy and self management  Decreased ROM/flexibility - manual therapy and therapeutic exercise  Decreased joint mobility - manual therapy and therapeutic exercise  Impaired gait - gait training  Impaired muscle performance - neuro re-education  Decreased function - therapeutic activities  Impaired posture - neuro re-education    Therapy Evaluation Codes:   1) History comprised of:   Personal factors that impact the plan of care:      None.    Comorbidity factors that impact the plan of care are:      None.     Medications impacting care: None.  2) Examination of Body Systems comprised of:   Body structures and functions that impact the plan of care:      Hip, Knee, Lumbar spine and Pelvis.   Activity limitations that impact the plan of care are:      Bending, Driving, Lifting, Running and Sitting.  3) Clinical presentation characteristics are:   Stable/Uncomplicated.  4) Decision-Making    Low complexity using standardized patient assessment instrument and/or measureable assessment of functional outcome.  Cumulative Therapy Evaluation is: Low complexity.    Previous  and current functional limitations:  (See Goal Flow Sheet for this information)    Short term and Long term goals: (See Goal Flow Sheet for this information)     Communication ability:  Patient appears to be able to clearly communicate and understand verbal and written communication and follow directions correctly.  Treatment Explanation - The following has been discussed with the patient:   RX ordered/plan of care  Anticipated outcomes  Possible risks and side effects  This patient would benefit from PT intervention to resume normal activities.   Rehab potential is excellent.    Frequency:  1 X week, once daily  Duration:  for 8 weeks  Discharge Plan:  Achieve all LTG.  Independent in home treatment program.  Reach maximal therapeutic benefit.    Please refer to the daily flowsheet for treatment today, total treatment time and time spent performing 1:1 timed codes.

## 2018-10-24 NOTE — MR AVS SNAPSHOT
"              After Visit Summary   10/24/2018    Miguel Moya    MRN: [id number]           Patient Information     Date Of Birth          1965        Visit Information        Provider Department      10/24/2018 8:40 AM Anahi Hardy, PT [place] [facility] [place] Physical Therapy        Today's Diagnoses     Low back pain    -  1       Follow-ups after your visit        Your next 10 appointments already scheduled     Nov 07, 2018  8:00 AM CST   ALTON Extremity with Anahi Hardy PT   [facility] [facility] [place] [facility] (LATON Nikky  )    [address]  [place] [address] [phone]              Who to contact     If you have questions or need follow up information about today's clinic visit or your schedule please contact [place] [facility] PHYSICAL THERAPY directly at [phone].  Normal or non-critical lab and imaging results will be communicated to you by MyChart, letter or phone within 4 business days after the clinic has received the results. If you do not hear from us within 7 days, please contact the clinic through LiveOpshart or phone. If you have a critical or abnormal lab result, we will notify you by phone as soon as possible.  Submit refill requests through Array Storm or call your pharmacy and they will forward the refill request to us. Please allow 3 business days for your refill to be completed.          Additional Information About Your Visit        MyChart Information     Array Storm lets you send messages to your doctor, view your test results, renew your prescriptions, schedule appointments and more. To sign up, go to [email or web address] . Click on \"Log in\" on the left side of the screen, which will take you to the Welcome page. Then click on \"Sign up Now\" on the right side of the page.     You will be asked to enter the access code listed below, as well as some personal information. Please follow the directions " to create your username and password.     Your access code is: [email or web address]  Expires: 2019  2:03 PM     Your access code will  in 90 days. If you need help or a new code, please call your [facility] clinic or [phone].        Care EveryWhere ID     This is your Care EveryWhere ID. This could be used by other organizations to access your [facility] medical records  [id number]         Blood Pressure from Last 3 Encounters:   12/10/15 143/89   13 100/80    Weight from Last 3 Encounters:   12/10/15 57.2 kg (126 lb)              We Performed the Following     HC PT EVAL, LOW COMPLEXITY     ALTON INITIAL EVAL REPORT     THERAPEUTIC EXERCISES        Primary Care Provider Office Phone # Fax #    Heber Gaetano Salazar [phone]       [facility] [address]    [facility] [address]        Equal Access to Services     JULY SAMANO AH: Hadii aad ku hadasho Soomaali, waaxda luqadaha, qaybta kaalmada adeegyada, waxay idiin hayaan adeeg kandace lovell . So [facility] [phone].    ATENCIÓN: Si jo ann stovall, tiene a ortega disposición servicios gratuitos de asistencia lingüística. Llame al [phone].    We comply with applicable federal civil rights laws and [place] laws. We do not discriminate on the basis of race, color, national origin, age, disability, sex, sexual orientation, or gender identity.            Thank you!     Thank you for choosing [facility] PHYSICAL THERAP[facility]  for your care. Our goal is always to provide you with excellent care. Hearing back from our patients is one way we can continue to improve our services. Please take a few minutes to complete the written survey that you may receive in the mail after your visit with us. Thank you!             Your Updated Medication List - Protect others around you: Learn how to safely use, store and throw away your medicines at www.disposemymeds.org.          This list is accurate as of  10/24/18  2:03 PM.  Always use your most recent med list.                   Brand Name Dispense Instructions for use Diagnosis    FLUTICASONE PROPIONATE           LEVOTHYROXINE SODIUM           ZYRTEC ALLERGY PO

## 2018-11-07 ENCOUNTER — THERAPY VISIT (OUTPATIENT)
Dept: PHYSICAL THERAPY | Facility: CLINIC | Age: 53
End: 2018-11-07
Payer: COMMERCIAL

## 2018-11-07 DIAGNOSIS — M54.50 LOW BACK PAIN: Primary | ICD-10-CM

## 2018-11-07 PROCEDURE — 97140 MANUAL THERAPY 1/> REGIONS: CPT | Mod: GP | Performed by: PHYSICAL THERAPIST

## 2018-11-07 PROCEDURE — 97110 THERAPEUTIC EXERCISES: CPT | Mod: GP | Performed by: PHYSICAL THERAPIST

## 2018-11-19 ENCOUNTER — THERAPY VISIT (OUTPATIENT)
Dept: PHYSICAL THERAPY | Facility: CLINIC | Age: 53
End: 2018-11-19
Payer: COMMERCIAL

## 2018-11-19 DIAGNOSIS — M54.50 LOW BACK PAIN: Primary | ICD-10-CM

## 2018-11-19 PROCEDURE — 97140 MANUAL THERAPY 1/> REGIONS: CPT | Mod: GP | Performed by: PHYSICAL THERAPIST

## 2018-11-19 PROCEDURE — 97110 THERAPEUTIC EXERCISES: CPT | Mod: GP | Performed by: PHYSICAL THERAPIST

## 2018-12-26 ENCOUNTER — THERAPY VISIT (OUTPATIENT)
Dept: PHYSICAL THERAPY | Facility: CLINIC | Age: 53
End: 2018-12-26
Payer: COMMERCIAL

## 2018-12-26 DIAGNOSIS — M54.41 RIGHT-SIDED LOW BACK PAIN WITH RIGHT-SIDED SCIATICA: Primary | ICD-10-CM

## 2018-12-26 PROCEDURE — 97110 THERAPEUTIC EXERCISES: CPT | Mod: GP | Performed by: PHYSICAL THERAPIST

## 2018-12-26 PROCEDURE — 97140 MANUAL THERAPY 1/> REGIONS: CPT | Mod: GP | Performed by: PHYSICAL THERAPIST

## 2019-01-28 ENCOUNTER — THERAPY VISIT (OUTPATIENT)
Dept: PHYSICAL THERAPY | Facility: CLINIC | Age: 54
End: 2019-01-28
Payer: COMMERCIAL

## 2019-01-28 DIAGNOSIS — M54.42 RIGHT-SIDED LOW BACK PAIN WITH LEFT-SIDED SCIATICA: Primary | ICD-10-CM

## 2019-01-28 PROCEDURE — 97140 MANUAL THERAPY 1/> REGIONS: CPT | Mod: GP | Performed by: PHYSICAL THERAPIST

## 2019-01-28 PROCEDURE — 97110 THERAPEUTIC EXERCISES: CPT | Mod: GP | Performed by: PHYSICAL THERAPIST

## 2019-03-11 ENCOUNTER — THERAPY VISIT (OUTPATIENT)
Dept: PHYSICAL THERAPY | Facility: CLINIC | Age: 54
End: 2019-03-11
Payer: COMMERCIAL

## 2019-03-11 DIAGNOSIS — M54.31 SCIATICA OF RIGHT SIDE: Primary | ICD-10-CM

## 2019-03-11 PROCEDURE — 97110 THERAPEUTIC EXERCISES: CPT | Mod: GP | Performed by: PHYSICAL THERAPIST

## 2019-03-11 PROCEDURE — 97140 MANUAL THERAPY 1/> REGIONS: CPT | Mod: GP | Performed by: PHYSICAL THERAPIST

## 2019-04-01 ENCOUNTER — THERAPY VISIT (OUTPATIENT)
Dept: PHYSICAL THERAPY | Facility: CLINIC | Age: 54
End: 2019-04-01
Payer: COMMERCIAL

## 2019-04-01 DIAGNOSIS — M54.31 SCIATICA OF RIGHT SIDE: Primary | ICD-10-CM

## 2019-04-01 PROCEDURE — 97140 MANUAL THERAPY 1/> REGIONS: CPT | Mod: GP | Performed by: PHYSICAL THERAPIST

## 2019-04-01 PROCEDURE — 97110 THERAPEUTIC EXERCISES: CPT | Mod: GP | Performed by: PHYSICAL THERAPIST

## 2019-04-15 ENCOUNTER — THERAPY VISIT (OUTPATIENT)
Dept: PHYSICAL THERAPY | Facility: CLINIC | Age: 54
End: 2019-04-15
Payer: COMMERCIAL

## 2019-04-15 DIAGNOSIS — M54.31 SCIATICA OF RIGHT SIDE: Primary | ICD-10-CM

## 2019-04-15 PROCEDURE — 97140 MANUAL THERAPY 1/> REGIONS: CPT | Mod: GP | Performed by: PHYSICAL THERAPIST

## 2019-04-15 PROCEDURE — 97110 THERAPEUTIC EXERCISES: CPT | Mod: GP | Performed by: PHYSICAL THERAPIST

## 2019-04-15 PROCEDURE — 97035 APP MDLTY 1+ULTRASOUND EA 15: CPT | Mod: GP | Performed by: PHYSICAL THERAPIST

## 2019-05-14 ENCOUNTER — THERAPY VISIT (OUTPATIENT)
Dept: PHYSICAL THERAPY | Facility: CLINIC | Age: 54
End: 2019-05-14
Payer: COMMERCIAL

## 2019-05-14 DIAGNOSIS — M54.42 RIGHT-SIDED LOW BACK PAIN WITH LEFT-SIDED SCIATICA: Primary | ICD-10-CM

## 2019-05-14 PROCEDURE — 97140 MANUAL THERAPY 1/> REGIONS: CPT | Mod: GP | Performed by: PHYSICAL THERAPIST

## 2019-05-14 PROCEDURE — 97110 THERAPEUTIC EXERCISES: CPT | Mod: GP | Performed by: PHYSICAL THERAPIST

## 2020-03-05 ENCOUNTER — THERAPY VISIT (OUTPATIENT)
Dept: PHYSICAL THERAPY | Facility: CLINIC | Age: 55
End: 2020-03-05
Payer: COMMERCIAL

## 2020-03-05 DIAGNOSIS — M53.3 SI (SACROILIAC) JOINT DYSFUNCTION: ICD-10-CM

## 2020-03-05 PROCEDURE — 97110 THERAPEUTIC EXERCISES: CPT | Mod: GP | Performed by: PHYSICAL THERAPIST

## 2020-03-05 PROCEDURE — 97161 PT EVAL LOW COMPLEX 20 MIN: CPT | Mod: GP | Performed by: PHYSICAL THERAPIST

## 2020-03-05 NOTE — LETTER
[facility]  [address]  [facility] [address]  [phone]    2020    Re: Miguel Moya   :   1965  MRN:  [id number]   REFERRING PHYSICIAN:   Mani Edwards    [facility]    Date of Initial Evaluation:  3/4/2020  Visits:  Rxs Used: 1  Reason for Referral:  SI (sacroiliac) joint dysfunction    EVALUATION SUMMARY    [facility]  Subjective:  Patient reports onset of right lower back 5 weeks ago, 2020 as he was bending over.  He felt a pop in that area as he bent.  He has local pain with sitting standing and walking.  He returned to biking that did not seem to be irritating.  He did slowly improve walking tolerance with local right SI area pain during and tightness following.  He is limited currently with lifting sleeping position as a motion sit to stand.  He has prior history of low back/pelvic area pain.  He has history of right knee surgery and to hernia surgeries.     Patient Health History  Pertinent medical history includes: implanted device, numbness/tingling and thyroid problems.   Red flags:  Pain at rest/night.  Medical allergies: other.   Surgeries include:  Other and orthopedic surgery.    Current medications:  Thyroid medication and other.    Current occupation is a .   Primary job tasks include:  Computer work.   Oswestry Score: 6 %           Objective:  Standing Alignment:    Lumbar:  Lordosis decr  Pelvic:  Iliac crest high R and ASIS high L  Gait:  Mild decreased pushoff right  Flexibility/Screens:   Negative screens: SI Joint     Lumbar/SI Evaluation  ROM:    AROM Lumbar:   Flexion:         Minimal loss with right SI area pain on return  Ext:                    Moderate loss   Side Bend:        Left:     Right:   Re: Miguel Moya   :   1965-Page 2    Rotation:           Left:     Right:   Side Glide:         Left:     Right:  Moderate loss with hips moving left  Lumbar Myotomes:  normal  Lumbar Dermtomes:  Lumbar dermatomes: Patient denies any sensory changes.  Lumbar Palpation:    Tenderness present at Left:    Quadratus Lumborum and Erector Spinae  Tenderness not present at Left:    Greater Trochanter  Tenderness present at Right: Quadratus Lumborum; Erector Spinae; Piriformis; PSIS and Gluteus Medius  Tenderness not present at Right:  Greater Trochanter  Lumbar Provocation:    Right positive with: Stork w/ext and PROM hip  Spinal Segmental Conclusions:   Level: Hypo noted at S1 and L5  SI joint/Sacrum:      Sacral conclusion right:  Anterior inominate          Assessment/Plan:    Patient is a 54 year old male with lumbar and sacral complaints.    Patient has the following significant findings with corresponding treatment plan.                Diagnosis 1: Right SI pain Pain -  hot/cold therapy, manual therapy and self management  Decreased ROM/flexibility - manual therapy and therapeutic exercise  Decreased joint mobility - manual therapy and therapeutic exercise  Impaired gait - gait training  Impaired muscle performance - neuro re-education  Decreased function - therapeutic activities    Therapy Evaluation Codes:   1) History comprised of:   Personal factors that impact the plan of care:      None.    Comorbidity factors that impact the plan of care are:      None.     Medications impacting care: None.  2) Examination of Body Systems comprised of:   Body structures and functions that impact the plan of care:      Hip, Lumbar spine, Pelvis and Sacral illiac joint.   Activity limitations that impact the plan of care are:      Bending, Lifting, Sitting, Standing and Working.  3) Clinical presentation characteristics are:   Stable/Uncomplicated.  4) Decision-Making    Low complexity using standardized patient assessment instrument and/or measureable assessment of functional outcome.  Cumulative Therapy Evaluation  is: Low complexity.    Previous and current functional limitations:  (See Goal Flow Sheet for this information)    Short term and Long term goals: (See Goal Flow Sheet for this information)     Communication ability:  Patient appears to be able to clearly communicate and  Re: Miguel Moya   :   1965-Page 3     understand verbal and written communication and follow directions correctly.  Treatment Explanation - The following has been discussed with the patient:   RX ordered/plan of care  Anticipated outcomes  Possible risks and side effects  This patient would benefit from PT intervention to resume normal activities.   Rehab potential is excellent.    Frequency: 1 X week, once daily  Duration:  for 6 weeks  Discharge Plan:  Achieve all LTG.  Independent in home treatment program.  Reach maximal therapeutic benefit.       Thank you for your referral.    INQUIRIES  Therapist: Anahi Hardy PT, [facility]  [facility] [place] [facility]  [address] [place] [address]  Phone: [phone]  Fax: [phone]

## 2020-03-09 PROBLEM — M53.3 SI (SACROILIAC) JOINT DYSFUNCTION: Status: ACTIVE | Noted: 2020-03-09

## 2020-03-09 NOTE — PROGRESS NOTES
[facility] [facility] Initial Evaluation  Subjective:    Patient Health History             Pertinent medical history includes: implanted device, numbness/tingling and thyroid problems.   Red flags:  Pain at rest/night.  Medical allergies: other.   Surgeries include:  Other and orthopedic surgery.    Current medications:  Thyroid medication and other.    Current occupation is a .   Primary job tasks include:  Computer work.                  Oswestry Score: 6 %                 Objective:  System    Physical Exam    General     ROS    Assessment/Plan:

## 2020-03-09 NOTE — PROGRESS NOTES
[facility] Initial Evaluation  Subjective:  Patient reports onset of right lower back 5 weeks ago, February 2020 as he was bending over.  He felt a pop in that area as he bent.  He has local pain with sitting standing and walking.  He returned to biking that did not seem to be irritating.  He did slowly improve walking tolerance with local right SI area pain during and tightness following.  He is limited currently with lifting sleeping position as a motion sit to stand.  He has prior history of low back/pelvic area pain.  He has history of right knee surgery and to hernia surgeries.                           Objective:  Standing Alignment:        Lumbar:  Lordosis decr  Pelvic:  Iliac crest high R and ASIS high L          Gait:  Mild decreased pushoff right        Flexibility/Screens:   Negative screens: SI Joint                    Lumbar/SI Evaluation  ROM:    AROM Lumbar:   Flexion:         Minimal loss with right SI area pain on return  Ext:                    Moderate loss   Side Bend:        Left:     Right:   Rotation:           Left:     Right:   Side Glide:        Left:     Right:  Moderate loss with hips moving left          Lumbar Myotomes:  normal                Lumbar Dermtomes:  Lumbar dermatomes: Patient denies any sensory changes.                  Lumbar Palpation:    Tenderness present at Left:    Quadratus Lumborum and Erector Spinae  Tenderness not present at Left:    Greater Trochanter  Tenderness present at Right: Quadratus Lumborum; Erector Spinae; Piriformis; PSIS and Gluteus Medius  Tenderness not present at Right:  Greater Trochanter    Lumbar Provocation:      Right positive with: Stork w/ext and PROM hip  Spinal Segmental Conclusions:     Level: Hypo noted at S1 and L5      SI joint/Sacrum:              Sacral conclusion right:  Anterior inominate                                             General     ROS    Assessment/Plan:    Patient is a 54 year old male with lumbar  and sacral complaints.    Patient has the following significant findings with corresponding treatment plan.                Diagnosis 1: Right SI pain Pain -  hot/cold therapy, manual therapy and self management  Decreased ROM/flexibility - manual therapy and therapeutic exercise  Decreased joint mobility - manual therapy and therapeutic exercise  Impaired gait - gait training  Impaired muscle performance - neuro re-education  Decreased function - therapeutic activities    Therapy Evaluation Codes:   1) History comprised of:   Personal factors that impact the plan of care:      None.    Comorbidity factors that impact the plan of care are:      None.     Medications impacting care: None.  2) Examination of Body Systems comprised of:   Body structures and functions that impact the plan of care:      Hip, Lumbar spine, Pelvis and Sacral illiac joint.   Activity limitations that impact the plan of care are:      Bending, Lifting, Sitting, Standing and Working.  3) Clinical presentation characteristics are:   Stable/Uncomplicated.  4) Decision-Making    Low complexity using standardized patient assessment instrument and/or measureable assessment of functional outcome.  Cumulative Therapy Evaluation is: Low complexity.    Previous and current functional limitations:  (See Goal Flow Sheet for this information)    Short term and Long term goals: (See Goal Flow Sheet for this information)     Communication ability:  Patient appears to be able to clearly communicate and understand verbal and written communication and follow directions correctly.  Treatment Explanation - The following has been discussed with the patient:   RX ordered/plan of care  Anticipated outcomes  Possible risks and side effects  This patient would benefit from PT intervention to resume normal activities.   Rehab potential is excellent.    Frequency: 1 X week, once daily  Duration:  for 6 weeks  Discharge Plan:  Achieve all LTG.  Independent in home  treatment program.  Reach maximal therapeutic benefit.    Please refer to the daily flowsheet for treatment today, total treatment time and time spent performing 1:1 timed codes.

## 2020-03-11 ENCOUNTER — THERAPY VISIT (OUTPATIENT)
Dept: PHYSICAL THERAPY | Facility: CLINIC | Age: 55
End: 2020-03-11
Payer: COMMERCIAL

## 2020-03-11 DIAGNOSIS — M53.3 SI (SACROILIAC) JOINT DYSFUNCTION: ICD-10-CM

## 2020-03-11 PROCEDURE — 97140 MANUAL THERAPY 1/> REGIONS: CPT | Mod: GP | Performed by: PHYSICAL THERAPIST

## 2020-03-11 PROCEDURE — 97110 THERAPEUTIC EXERCISES: CPT | Mod: GP | Performed by: PHYSICAL THERAPIST

## 2020-03-18 NOTE — PROGRESS NOTES
Discharge Note    Progress reporting period is from initial eval/last PN to May 14, 2019.     Patient seen for   visits.    SUBJECTIVE  Subjective changes noted by patient:  He did 28 miles on bike with prox HS soreness, ran 4.5 miles with sorenss in LS R , sitting in car is the worst, he is working standing most often. nagging aching vs sharp pain stopping activity, he did rn three times this week which is more than he has recently   .  Changes in function:  Yes (See Goal flowsheet attached for changes in current functional level)  Adverse reaction to treatment or activity: None    OBJECTIVE  Changes noted in objective findings: sciatic tension at prox HS mod, ER hip min loss L, R knee flexion min loss with mid and lateral distal quad mod restriction, R LS dec ext     ASSESSMENT/PLAN  Diagnosis: sciatica R    DIAGP:  The encounter diagnosis was Right-sided low back pain with left-sided sciatica.  Updated problem list and treatment plan:   Decreased function - HEP  STG/LTGs have been met or progress has been made towards goals:  Yes, please see goal flowsheet for most current information  Assessment of Progress: current status is unknown.    Last current status:     Self Management Plans:  HEP  I have re-evaluated this patient and find that the nature, scope, duration and intensity of the therapy is appropriate for the medical condition of the patient.  Miguel continues to require the following intervention to meet STG and LTG's:  HEP.    Recommendations:  Discharge with current home program.  Patient to follow up with MD as needed.    Please refer to the daily flowsheet for treatment today, total treatment time and time spent performing 1:1 timed codes.

## 2020-05-19 ENCOUNTER — ALLIED HEALTH/NURSE VISIT (OUTPATIENT)
Dept: ADMINISTRATIVE | Facility: OTHER | Age: 55
End: 2020-05-19

## 2020-05-19 DIAGNOSIS — Z53.9 ERRONEOUS ENCOUNTER--DISREGARD: Primary | ICD-10-CM

## 2020-05-19 PROCEDURE — 99207 PR NO CHARGE LOS: CPT | Performed by: PHYSICAL THERAPIST

## 2020-08-05 DIAGNOSIS — Z11.59 ENCOUNTER FOR SCREENING FOR OTHER VIRAL DISEASES: Primary | ICD-10-CM

## 2020-08-26 DIAGNOSIS — Z11.59 ENCOUNTER FOR SCREENING FOR OTHER VIRAL DISEASES: ICD-10-CM

## 2020-08-26 PROCEDURE — U0003 INFECTIOUS AGENT DETECTION BY NUCLEIC ACID (DNA OR RNA); SEVERE ACUTE RESPIRATORY SYNDROME CORONAVIRUS 2 (SARS-COV-2) (CORONAVIRUS DISEASE [COVID-19]), AMPLIFIED PROBE TECHNIQUE, MAKING USE OF HIGH THROUGHPUT TECHNOLOGIES AS DESCRIBED BY CMS-2020-01-R: HCPCS | Performed by: COLON & RECTAL SURGERY

## 2020-08-27 LAB
SARS-COV-2 RNA SPEC QL NAA+PROBE: NOT DETECTED
SPECIMEN SOURCE: NORMAL

## 2020-08-28 ENCOUNTER — HOSPITAL ENCOUNTER (OUTPATIENT)
Facility: CLINIC | Age: 55
Discharge: HOME | End: 2020-08-28
Attending: COLON & RECTAL SURGERY | Admitting: COLON & RECTAL SURGERY
Payer: COMMERCIAL

## 2020-08-28 VITALS
DIASTOLIC BLOOD PRESSURE: 82 MMHG | WEIGHT: 128 LBS | HEART RATE: 62 BPM | BODY MASS INDEX: 21.85 KG/M2 | HEIGHT: 64 IN | OXYGEN SATURATION: 98 % | SYSTOLIC BLOOD PRESSURE: 121 MMHG | RESPIRATION RATE: 32 BRPM

## 2020-08-28 LAB — COLONOSCOPY: NORMAL

## 2020-08-28 PROCEDURE — G0121 COLON CA SCRN NOT HI RSK IND: HCPCS | Performed by: COLON & RECTAL SURGERY

## 2020-08-28 PROCEDURE — 45378 DIAGNOSTIC COLONOSCOPY: CPT | Performed by: COLON & RECTAL SURGERY

## 2020-08-28 PROCEDURE — G0500 MOD SEDAT ENDO SERVICE >5YRS: HCPCS | Performed by: COLON & RECTAL SURGERY

## 2020-08-28 PROCEDURE — 25000128 H RX IP 250 OP 636: Performed by: COLON & RECTAL SURGERY

## 2020-08-28 RX ORDER — ONDANSETRON 4 MG/1
4 TABLET, ORALLY DISINTEGRATING ORAL EVERY 6 HOURS PRN
Status: DISCONTINUED | OUTPATIENT
Start: 2020-08-28 | End: 2020-08-28 | Stop reason: HOSPADM

## 2020-08-28 RX ORDER — ONDANSETRON 2 MG/ML
4 INJECTION INTRAMUSCULAR; INTRAVENOUS EVERY 6 HOURS PRN
Status: DISCONTINUED | OUTPATIENT
Start: 2020-08-28 | End: 2020-08-28 | Stop reason: HOSPADM

## 2020-08-28 RX ORDER — FLUMAZENIL 0.1 MG/ML
0.2 INJECTION, SOLUTION INTRAVENOUS
Status: DISCONTINUED | OUTPATIENT
Start: 2020-08-28 | End: 2020-08-28 | Stop reason: HOSPADM

## 2020-08-28 RX ORDER — CHLORAL HYDRATE 500 MG
2 CAPSULE ORAL DAILY
COMMUNITY

## 2020-08-28 RX ORDER — DIPHENHYDRAMINE HYDROCHLORIDE 50 MG/ML
25 INJECTION INTRAMUSCULAR; INTRAVENOUS EVERY 4 HOURS PRN
Status: DISCONTINUED | OUTPATIENT
Start: 2020-08-28 | End: 2020-08-28 | Stop reason: HOSPADM

## 2020-08-28 RX ORDER — FENTANYL CITRATE 50 UG/ML
INJECTION, SOLUTION INTRAMUSCULAR; INTRAVENOUS PRN
Status: DISCONTINUED | OUTPATIENT
Start: 2020-08-28 | End: 2020-08-28 | Stop reason: HOSPADM

## 2020-08-28 RX ORDER — MULTIVIT WITH MINERALS/LUTEIN
250 TABLET ORAL DAILY
COMMUNITY

## 2020-08-28 RX ORDER — DIPHENHYDRAMINE HCL 25 MG
25 CAPSULE ORAL EVERY 4 HOURS PRN
Status: DISCONTINUED | OUTPATIENT
Start: 2020-08-28 | End: 2020-08-28 | Stop reason: HOSPADM

## 2020-08-28 RX ORDER — LIDOCAINE 40 MG/G
CREAM TOPICAL
Status: DISCONTINUED | OUTPATIENT
Start: 2020-08-28 | End: 2020-08-28 | Stop reason: HOSPADM

## 2020-08-28 RX ORDER — NALOXONE HYDROCHLORIDE 0.4 MG/ML
.1-.4 INJECTION, SOLUTION INTRAMUSCULAR; INTRAVENOUS; SUBCUTANEOUS
Status: DISCONTINUED | OUTPATIENT
Start: 2020-08-28 | End: 2020-08-28 | Stop reason: HOSPADM

## 2020-08-28 RX ORDER — ONDANSETRON 2 MG/ML
4 INJECTION INTRAMUSCULAR; INTRAVENOUS
Status: DISCONTINUED | OUTPATIENT
Start: 2020-08-28 | End: 2020-08-28 | Stop reason: HOSPADM

## 2020-08-28 ASSESSMENT — MIFFLIN-ST. JEOR: SCORE: 1321.6

## 2020-08-28 NOTE — OP NOTE
See Provation Note In Chart    Callie Nails MD  [facility]  Office Phone # [phone]

## 2020-08-28 NOTE — H&P
Pre-Endoscopy History and Physical     Miguel Moya MRN# [id number]   YOB: 1965 Age: 54 year old     Date of Procedure: 08/28/20  Primary care provider: Heber Salazar  Type of Endoscopy: Colonoscopy  Reason for Procedure: family history of colon cancer   Type of Anesthesia Anticipated: Moderate Sedation    HPI:    Miguel is a 54 year old male who will be undergoing the above procedure.      A history and physical has been performed. The patient's medications and allergies have been reviewed. The risks and benefits of the procedure and the sedation options and risks were discussed with the patient.  All questions were answered and informed consent was obtained.      He denies a personal or family history of anesthesia complications or bleeding disorders.     Allergies   Allergen Reactions     Latex Blisters        No current facility-administered medications on file prior to encounter.   Cetirizine HCl (ZYRTEC ALLERGY PO),   fish oil-omega-3 fatty acids 1000 MG capsule, Take 2 g by mouth daily  FLUTICASONE PROPIONATE,   LEVOTHYROXINE SODIUM,   vitamin C (ASCORBIC ACID) 250 MG tablet, Take 250 mg by mouth daily  VITAMIN D, CHOLECALCIFEROL, PO, Take by mouth daily        Patient Active Problem List   Diagnosis     Hip pain     Ankle sprain     Hamstring muscle strain     Pain in thoracic spine     Groin pain     Aftercare following surgery of the musculoskeletal system     Chronic pain of right knee     SI (sacroiliac) joint dysfunction        Past Medical History:   Diagnosis Date     Thyroid disease         Past Surgical History:   Procedure Laterality Date     COLONOSCOPY       HC KNEE SCOPE,MED/LAT MENISCUS REPAIR Right      HERNIA REPAIR         Social History     Tobacco Use     Smoking status: Never Smoker     Smokeless tobacco: Former User     Types: Chew   Substance Use Topics     Alcohol use: Yes     Comment: 3 days a week       Family History   Problem Relation Age of Onset      "Cancer Father      Gastrointestinal Disease Sister        REVIEW OF SYSTEMS:     5 point ROS negative except as noted above in HPI, including Gen., Resp., CV, GI &  system review.      PHYSICAL EXAM:   /78   Pulse 55   Resp (!) 6   Ht 1.626 m (5' 4\")   Wt 58.1 kg (128 lb)   SpO2 98%   BMI 21.97 kg/m   Estimated body mass index is 21.97 kg/m  as calculated from the following:    Height as of this encounter: 1.626 m (5' 4\").    Weight as of this encounter: 58.1 kg (128 lb).   GENERAL APPEARANCE: healthy and alert  MENTAL STATUS: alert  AIRWAY EXAM: Mallampatti Class II (visualization of the soft palate, fauces, and uvula)  RESP: lungs clear to auscultation - no rales, rhonchi or wheezes  CV: regular rates and rhythm      IMPRESSION   ASA Class 2 - Mild systemic disease        PLAN:     Plan for colonoscopy. We discussed the risks, benefits and alternatives and the patient wished to proceed.    The above has been forwarded to the consulting provider.      Callie Nails MD  [facility]  Phone: [phone]  Fax: [phone]  August 28, 2020    "

## 2021-03-28 ENCOUNTER — HEALTH MAINTENANCE LETTER (OUTPATIENT)
Age: 56
End: 2021-03-28

## 2021-06-16 ENCOUNTER — THERAPY VISIT (OUTPATIENT)
Dept: PHYSICAL THERAPY | Facility: CLINIC | Age: 56
End: 2021-06-16
Payer: COMMERCIAL

## 2021-06-16 DIAGNOSIS — S93.402A LEFT ANKLE SPRAIN: ICD-10-CM

## 2021-06-16 PROCEDURE — 97110 THERAPEUTIC EXERCISES: CPT | Mod: GP | Performed by: PHYSICAL THERAPIST

## 2021-06-16 PROCEDURE — 97161 PT EVAL LOW COMPLEX 20 MIN: CPT | Mod: GP | Performed by: PHYSICAL THERAPIST

## 2021-06-16 NOTE — PROGRESS NOTES
Physical Therapy Initial Evaluation  Subjective:    Patient Health History           General health as reported by patient is excellent.  Pertinent medical history includes: thyroid problems and numbness/tingling.     Medical allergies: latex.   Surgeries include:  Orthopedic surgery and other. Other surgery history details: hernia.    Current medications:  Thyroid medication and other. Other medications details: allergy inhaler.    Current occupation is finance.   Primary job tasks include:  Computer work.                                    Objective:  System    Physical Exam    General     ROS    Assessment/Plan:

## 2021-06-16 NOTE — LETTER
[facility]  [address] [place] [address] [id number]  [facility] [address]  [phone]    2021    Re: Miguel Moya   :   1965  MRN:  [id number]   REFERRING PHYSICIAN:   Mani LERNER [facility]  Date of Initial Evaluation:  2021  Visits:  Rxs Used: 1  Reason for Referral:  Left ankle sprain    Physical Therapy Initial Evaluation  Subjective:  Patient reports onset of left ankle pain on 2021 while running.  He reports spraining it with inversion motion.  He had x-ray that was negative he wore a boot for 1 week.  Yonis-ray was also negative.  He did have discoloration inferior to malleolus and lateral foot and distal Achilles.  He currently has tightness and pain with weightbearing, pain, and with any motions into inversion or eversion.  He is no longer using the walking boot.    Patient Health History  General health as reported by patient is excellent.  Pertinent medical history includes: thyroid problems and numbness/tingling.   Medical allergies: latex.   Surgeries include:  Orthopedic surgery and other. Other surgery history details: hernia.    Current medications:  Thyroid medication and other. Other medications details: allergy inhaler.    Current occupation is finance.   Primary job tasks include:  Computer work.   Objective:  Standing Alignment:   General deviations alignment: Decreased weightbearing left.  Gait:    Gait Type:  Antalgic     Deviations:  Hip:  Hip hiking LAnkle:  Push off decr LGeneral Deviations:  Stance time decr  Flexibility/Screens:   Positive screens:  Lumbar (Previous history low back pain managed well) and Knee (Previous right knee surgery)  Lower Extremity:  Decreased left lower extremity flexibility:Soleus  Ankle/Foot Evaluation  ROM:    AROM:    Dorsiflexion: Left:   -2  Right:    PROM:    Dorsiflexion: Left:    2     Right:     Pain: With AROM plantarflexion inversion and eversion  left  Re: Miguel Moya   :   1965    Strength:    Dorsiflexion:  Left: /5 Strong/pain free    Pain:     Plantarflexion: Left: /5  Strong/painful  Pain:-/+     Inversion:Left: /5 strong/painful Pain:     Right: /5 strong/painful Pain:  Eversion:Left: /5 strong/painful  Pain:++    PALPATION:   Left ankle tenderness present at:  gastroc/soleus; achilles tendon; posterior talofibular ligament; calcaneofibular ligament; lateral malleolus; anterior tibiofibular ligament and posterior tibiofibular ligament  EDEMA: Edema ankle: , Moderate left ankle not into  forefoot.    Assessment/Plan:    Patient is a 55 year old male with left side ankle complaints.    Patient has the following significant findings with corresponding treatment plan.                Diagnosis 1: Left lateral ankle sprain Pain -  hot/cold therapy, electric stimulation, manual therapy and self management  Decreased ROM/flexibility - manual therapy and therapeutic exercise  Decreased joint mobility - manual therapy and therapeutic exercise  Inflammation - cold therapy and electric stimulation  Impaired gait - gait training  Impaired muscle performance - neuro re-education  Decreased function - therapeutic activities  Instability -  Therapeutic Activity  Therapeutic Exercise    Therapy Evaluation Codes:   1) History comprised of:   Personal factors that impact the plan of care:      None.    Comorbidity factors that impact the plan of care are:      None.     Medications impacting care: None.  2) Examination of Body Systems comprised of:   Body structures and functions that impact the plan of care:      Ankle.   Activity limitations that impact the plan of care are:      Running, Sports, Squatting/kneeling, Stairs, Standing and Walking.  3) Clinical presentation characteristics are:   Stable/Uncomplicated.  4) Decision-Making    Low complexity using standardized patient assessment instrument and/or measureable assessment of functional  outcome.  Cumulative Therapy Evaluation is: Low complexity.    Previous and current functional limitations:  (See Goal Flow Sheet for this information)    Short term and Long term goals: (See Goal Flow Sheet for this information)     Communication ability:  Patient appears to be able to clearly communicate and understand verbal and written communication and follow directions correctly.  Treatment Explanation - The following has been discussed with the patient:   RX ordered/plan of care  Re: Miguel Moya   :   1965    Anticipated outcomes; Possible risks and side effects  This patient would benefit from PT intervention to resume normal activities.   Rehab potential is excellent.    Frequency:  1 X week, once daily  Duration:  for 6 weeks  Discharge Plan:  Achieve all LTG.  Independent in home treatment program.  Reach maximal therapeutic benefit.    Thank you for your referral.    INQUIRIES  Therapist: Anahi Hardy PT, ATC  [address] [address]  Phone: [phone]  Fax: [phone]

## 2021-06-16 NOTE — PROGRESS NOTES
Physical Therapy Initial Evaluation  Subjective:  Patient reports onset of left ankle pain on 6 5 2021 while running.  He reports spraining it with inversion motion.  He had x-ray that was negative he wore a boot for 1 week.  Yonis-ray was also negative.  He did have discoloration inferior to malleolus and lateral foot and distal Achilles.  He currently has tightness and pain with weightbearing, pain, and with any motions into inversion or eversion.  He is no longer using the walking boot.                            Objective:  Standing Alignment:                  General deviations alignment: Decreased weightbearing left.  Gait:    Gait Type:  Antalgic     Deviations:  Hip:  Hip hiking LAnkle:  Push off decr LGeneral Deviations:  Stance time decr    Flexibility/Screens:   Positive screens:  Lumbar (Previous history low back pain managed well) and Knee (Previous right knee surgery)    Lower Extremity:  Decreased left lower extremity flexibility:Soleus            Ankle/Foot Evaluation  ROM:    AROM:    Dorsiflexion: Left:   -2  Right:              PROM:    Dorsiflexion: Left:    2     Right:               Pain: With AROM plantarflexion inversion and eversion left    Strength:    Dorsiflexion:  Left: /5 Strong/pain free    Pain:     Plantarflexion: Left: /5  Strong/painful  Pain:-/+     Inversion:Left: /5 strong/painful Pain:     Right: /5 strong/painful Pain:  Eversion:Left: /5 strong/painful  Pain:++                        PALPATION:   Left ankle tenderness present at:  gastroc/soleus; achilles tendon; posterior talofibular ligament; calcaneofibular ligament; lateral malleolus; anterior tibiofibular ligament and posterior tibiofibular ligament    EDEMA: Edema ankle: , Moderate left ankle not into  forefoot.                                                              General     ROS    Assessment/Plan:    Patient is a 55 year old male with left side ankle complaints.    Patient has the following significant findings  with corresponding treatment plan.                Diagnosis 1: Left lateral ankle sprain Pain -  hot/cold therapy, electric stimulation, manual therapy and self management  Decreased ROM/flexibility - manual therapy and therapeutic exercise  Decreased joint mobility - manual therapy and therapeutic exercise  Inflammation - cold therapy and electric stimulation  Impaired gait - gait training  Impaired muscle performance - neuro re-education  Decreased function - therapeutic activities  Instability -  Therapeutic Activity  Therapeutic Exercise    Therapy Evaluation Codes:   1) History comprised of:   Personal factors that impact the plan of care:      None.    Comorbidity factors that impact the plan of care are:      None.     Medications impacting care: None.  2) Examination of Body Systems comprised of:   Body structures and functions that impact the plan of care:      Ankle.   Activity limitations that impact the plan of care are:      Running, Sports, Squatting/kneeling, Stairs, Standing and Walking.  3) Clinical presentation characteristics are:   Stable/Uncomplicated.  4) Decision-Making    Low complexity using standardized patient assessment instrument and/or measureable assessment of functional outcome.  Cumulative Therapy Evaluation is: Low complexity.    Previous and current functional limitations:  (See Goal Flow Sheet for this information)    Short term and Long term goals: (See Goal Flow Sheet for this information)     Communication ability:  Patient appears to be able to clearly communicate and understand verbal and written communication and follow directions correctly.  Treatment Explanation - The following has been discussed with the patient:   RX ordered/plan of care  Anticipated outcomes  Possible risks and side effects  This patient would benefit from PT intervention to resume normal activities.   Rehab potential is excellent.    Frequency:  1 X week, once daily  Duration:  for 6 weeks  Discharge  Plan:  Achieve all LTG.  Independent in home treatment program.  Reach maximal therapeutic benefit.    Please refer to the daily flowsheet for treatment today, total treatment time and time spent performing 1:1 timed codes.

## 2021-06-24 ENCOUNTER — THERAPY VISIT (OUTPATIENT)
Dept: PHYSICAL THERAPY | Facility: CLINIC | Age: 56
End: 2021-06-24
Payer: COMMERCIAL

## 2021-06-24 DIAGNOSIS — S93.402A LEFT ANKLE SPRAIN: ICD-10-CM

## 2021-06-24 PROCEDURE — 97140 MANUAL THERAPY 1/> REGIONS: CPT | Mod: GP | Performed by: PHYSICAL THERAPIST

## 2021-06-24 PROCEDURE — 97110 THERAPEUTIC EXERCISES: CPT | Mod: GP | Performed by: PHYSICAL THERAPIST

## 2021-06-24 PROCEDURE — G0283 ELEC STIM OTHER THAN WOUND: HCPCS | Mod: GP | Performed by: PHYSICAL THERAPIST

## 2021-07-07 ENCOUNTER — THERAPY VISIT (OUTPATIENT)
Dept: PHYSICAL THERAPY | Facility: CLINIC | Age: 56
End: 2021-07-07
Payer: COMMERCIAL

## 2021-07-07 DIAGNOSIS — S93.402A LEFT ANKLE SPRAIN: ICD-10-CM

## 2021-07-07 PROCEDURE — G0283 ELEC STIM OTHER THAN WOUND: HCPCS | Mod: GP | Performed by: PHYSICAL THERAPIST

## 2021-07-07 PROCEDURE — 97110 THERAPEUTIC EXERCISES: CPT | Mod: GP | Performed by: PHYSICAL THERAPIST

## 2021-07-07 PROCEDURE — 97140 MANUAL THERAPY 1/> REGIONS: CPT | Mod: GP | Performed by: PHYSICAL THERAPIST

## 2021-07-14 ENCOUNTER — THERAPY VISIT (OUTPATIENT)
Dept: PHYSICAL THERAPY | Facility: CLINIC | Age: 56
End: 2021-07-14
Payer: COMMERCIAL

## 2021-07-14 DIAGNOSIS — S93.402A LEFT ANKLE SPRAIN: ICD-10-CM

## 2021-07-14 PROCEDURE — 97110 THERAPEUTIC EXERCISES: CPT | Mod: GP | Performed by: PHYSICAL THERAPIST

## 2021-07-14 PROCEDURE — 97140 MANUAL THERAPY 1/> REGIONS: CPT | Mod: GP | Performed by: PHYSICAL THERAPIST

## 2021-07-14 PROCEDURE — 97112 NEUROMUSCULAR REEDUCATION: CPT | Mod: GP | Performed by: PHYSICAL THERAPIST

## 2021-09-11 ENCOUNTER — HEALTH MAINTENANCE LETTER (OUTPATIENT)
Age: 56
End: 2021-09-11

## 2021-10-12 ENCOUNTER — TRANSFERRED RECORDS (OUTPATIENT)
Dept: PHYSICAL THERAPY | Facility: CLINIC | Age: 56
End: 2021-10-12

## 2021-11-15 ENCOUNTER — THERAPY VISIT (OUTPATIENT)
Dept: PHYSICAL THERAPY | Facility: CLINIC | Age: 56
End: 2021-11-15
Payer: COMMERCIAL

## 2021-11-15 DIAGNOSIS — S93.402A LEFT ANKLE SPRAIN: ICD-10-CM

## 2021-11-15 PROCEDURE — 97140 MANUAL THERAPY 1/> REGIONS: CPT | Mod: GP | Performed by: PHYSICAL THERAPIST

## 2021-11-15 PROCEDURE — 97110 THERAPEUTIC EXERCISES: CPT | Mod: GP | Performed by: PHYSICAL THERAPIST

## 2021-11-29 ENCOUNTER — THERAPY VISIT (OUTPATIENT)
Dept: PHYSICAL THERAPY | Facility: CLINIC | Age: 56
End: 2021-11-29
Payer: COMMERCIAL

## 2021-11-29 DIAGNOSIS — S93.402A LEFT ANKLE SPRAIN: ICD-10-CM

## 2021-11-29 PROCEDURE — 97110 THERAPEUTIC EXERCISES: CPT | Mod: GP | Performed by: PHYSICAL THERAPIST

## 2021-11-29 PROCEDURE — 97112 NEUROMUSCULAR REEDUCATION: CPT | Mod: GP | Performed by: PHYSICAL THERAPIST

## 2021-11-29 PROCEDURE — 97140 MANUAL THERAPY 1/> REGIONS: CPT | Mod: GP | Performed by: PHYSICAL THERAPIST

## 2021-12-08 DIAGNOSIS — Z13.220 SCREENING FOR HYPERLIPIDEMIA: Primary | ICD-10-CM

## 2021-12-13 ENCOUNTER — THERAPY VISIT (OUTPATIENT)
Dept: PHYSICAL THERAPY | Facility: CLINIC | Age: 56
End: 2021-12-13
Payer: COMMERCIAL

## 2021-12-13 DIAGNOSIS — S93.402A LEFT ANKLE SPRAIN: ICD-10-CM

## 2021-12-13 PROCEDURE — 97110 THERAPEUTIC EXERCISES: CPT | Mod: GP | Performed by: PHYSICAL THERAPIST

## 2021-12-13 PROCEDURE — 97112 NEUROMUSCULAR REEDUCATION: CPT | Mod: GP | Performed by: PHYSICAL THERAPIST

## 2021-12-13 PROCEDURE — 97140 MANUAL THERAPY 1/> REGIONS: CPT | Mod: GP | Performed by: PHYSICAL THERAPIST

## 2021-12-23 ENCOUNTER — HOSPITAL ENCOUNTER (OUTPATIENT)
Dept: CARDIOLOGY | Facility: CLINIC | Age: 56
Discharge: HOME | End: 2021-12-23
Attending: FAMILY MEDICINE | Admitting: FAMILY MEDICINE

## 2021-12-23 DIAGNOSIS — Z13.220 SCREENING FOR HYPERLIPIDEMIA: ICD-10-CM

## 2021-12-23 PROCEDURE — 75571 CT HRT W/O DYE W/CA TEST: CPT | Mod: 26 | Performed by: INTERNAL MEDICINE

## 2021-12-23 PROCEDURE — 75571 CT HRT W/O DYE W/CA TEST: CPT

## 2021-12-28 ENCOUNTER — THERAPY VISIT (OUTPATIENT)
Dept: PHYSICAL THERAPY | Facility: CLINIC | Age: 56
End: 2021-12-28
Payer: COMMERCIAL

## 2021-12-28 DIAGNOSIS — S93.402A LEFT ANKLE SPRAIN: ICD-10-CM

## 2021-12-28 PROCEDURE — 97140 MANUAL THERAPY 1/> REGIONS: CPT | Mod: GP | Performed by: PHYSICAL THERAPIST

## 2021-12-28 PROCEDURE — 97110 THERAPEUTIC EXERCISES: CPT | Mod: GP | Performed by: PHYSICAL THERAPIST

## 2021-12-28 PROCEDURE — 97112 NEUROMUSCULAR REEDUCATION: CPT | Mod: GP | Performed by: PHYSICAL THERAPIST

## 2022-04-23 ENCOUNTER — HEALTH MAINTENANCE LETTER (OUTPATIENT)
Age: 57
End: 2022-04-23

## 2022-08-08 ENCOUNTER — TRANSCRIBE ORDERS (OUTPATIENT)
Dept: OTHER | Age: 57
End: 2022-08-08

## 2022-08-08 DIAGNOSIS — S86.812A STRAIN OF LEFT CALF MUSCLE: Primary | ICD-10-CM

## 2022-08-17 ENCOUNTER — THERAPY VISIT (OUTPATIENT)
Dept: PHYSICAL THERAPY | Facility: CLINIC | Age: 57
End: 2022-08-17
Payer: COMMERCIAL

## 2022-08-17 DIAGNOSIS — M79.662 PAIN OF LEFT CALF: ICD-10-CM

## 2022-08-17 PROCEDURE — 97110 THERAPEUTIC EXERCISES: CPT | Mod: GP | Performed by: PHYSICAL THERAPIST

## 2022-08-17 PROCEDURE — 97161 PT EVAL LOW COMPLEX 20 MIN: CPT | Mod: GP | Performed by: PHYSICAL THERAPIST

## 2022-08-17 NOTE — PROGRESS NOTES
Physical Therapy Initial Evaluation  Subjective:  Pt reports onset of L post lower leg tightness and aching to sharp pain June 25, 2022 Had sig inc in pain during a run. Prior to that date, he had some tightness in the area but had not limited running or walking. He has been running on TM for the winter. He is biking without issue unless standing on the pedals for a hill.  He is currently not running due to symptoms    The history is provided by the patient. No  was used.                       Objective:  Standing Alignment:        Lumbar:  Lordosis decr      Knee deviations alignment: dec R knee ext in standing.      Gait:  Dec push off L, mild dec stance time L, dec lumbar ext B push off        Flexibility/Screens:   Positive screens:  Lumbar (mod loss ext, SLS lumbar ext max loss L mod loss R ) and Knee    Lower Extremity:  Decreased left lower extremity flexibility:Hip IR's; Hip ER's; Adductors; Piriformis; Hip Flexors; Gastroc and Soleus    Decreased right lower extremity flexibility:  Hip Flexors and Soleus  Spine:  Decreased left spine flexibility:  Quadratus Lumborum    Decreased right spine flexibility:  Quadratus Lumborum        Ankle/Foot Evaluation  ROM:      PROM:                  Endfeel:  Firm end feel L DF PROM , mod loss,   Strength is normal.      PALPATION:   Left ankle tenderness present at:  gastroc/soleus (intermuscular septum, medial compartment restrition s) and achilles tendon (at soleus junction tender)      MOBILITY TESTING:       Talocrural Left: hypomobile            FUNCTIONAL TESTS:         Quad:  Single Leg Squat Left: Control is decreased hip/trunk flexion, excessive anterior knee excursion and moderate loss of control.  Single Leg Squat Right: Control is decreased hip/trunk flexion and mild loss of control                                                          General     ROS    Assessment/Plan:    Patient is a 56 year old male with L lower leg complaints.     Patient has the following significant findings with corresponding treatment plan.                Diagnosis 1:  L calf pain   Pain -  hot/cold therapy, manual therapy and self management  Decreased ROM/flexibility - manual therapy and therapeutic exercise  Decreased joint mobility - manual therapy and therapeutic exercise  Impaired gait - gait training  Impaired muscle performance - neuro re-education  Decreased function - therapeutic activities    Therapy Evaluation Codes:   1) History comprised of:   Personal factors that impact the plan of care:      None.    Comorbidity factors that impact the plan of care are:      None.     Medications impacting care: None.  2) Examination of Body Systems comprised of:   Body structures and functions that impact the plan of care:      Ankle, Hip and Lumbar spine.   Activity limitations that impact the plan of care are:      Jumping, Lifting, Running, Sports, Squatting/kneeling, Stairs and Walking.  3) Clinical presentation characteristics are:   Stable/Uncomplicated.  4) Decision-Making    Low complexity using standardized patient assessment instrument and/or measureable assessment of functional outcome.  Cumulative Therapy Evaluation is: Low complexity.    Previous and current functional limitations:  (See Goal Flow Sheet for this information)    Short term and Long term goals: (See Goal Flow Sheet for this information)     Communication ability:  Patient appears to be able to clearly communicate and understand verbal and written communication and follow directions correctly.  Treatment Explanation - The following has been discussed with the patient:   RX ordered/plan of care  Anticipated outcomes  Possible risks and side effects  This patient would benefit from PT intervention to resume normal activities.   Rehab potential is excellent.    Frequency:  1 X week, once daily  Duration:  for 6 weeks  Discharge Plan:  Achieve all LTG.  Independent in home treatment  program.  Reach maximal therapeutic benefit.    Please refer to the daily flowsheet for treatment today, total treatment time and time spent performing 1:1 timed codes.

## 2022-08-17 NOTE — LETTER
[address]  [facility] [address]  [phone]    2022    Re: Miguel Moya   :   1965  MRN:  [id number]   REFERRING PHYSICIAN:   Mani LERNER [facility]    Date of Initial Evaluation:  22  Visits:  Rxs Used: 1  Reason for Referral:  Pain of left calf    Physical Therapy Initial Evaluation  Subjective:  Pt reports onset of L post lower leg tightness and aching to sharp pain 2022 Had sig inc in pain during a run. Prior to that date, he had some tightness in the area but had not limited running or walking. He has been running on TM for the winter. He is biking without issue unless standing on the pedals for a hill.  He is currently not running due to symptoms    The history is provided by the patient. No  was used.   Pertinent medical history includes: implanted device, numbness/tingling and thyroid problems.   Red flags:  Calf pain-swelling-warmth.  Medical allergies: adhesives (many topical items).   Surgeries include:  Orthopedic surgery (2 hernias, left meniscus).    Current medications:  Thyroid medication (Nasal inhaler for allergy).    Current occupation is [employer].   Primary job tasks include:  Computer work.                   Objective:  Standing Alignment:    Lumbar:  Lordosis decr  Knee deviations alignment: dec R knee ext in standing.  Gait:  Dec push off L, mild dec stance time L, dec lumbar ext B push off  Flexibility/Screens:   Positive screens:  Lumbar (mod loss ext, SLS lumbar ext max loss L mod loss R ) and Knee  Lower Extremity:  Decreased left lower extremity flexibility:Hip IR's; Hip ER's; Adductors; Piriformis; Hip Flexors; Gastroc and Soleus  Decreased right lower extremity flexibility:  Hip Flexors and Soleus  Spine:  Decreased left spine flexibility:  Quadratus Lumborum  Decreased right spine flexibility:  Quadratus  Lumborum      Re: Miguel Moya   :   1965    Ankle/Foot Evaluation  ROM:      PROM:    Endfeel:  Firm end feel L DF PROM , mod loss,   Strength is normal.  PALPATION:   Left ankle tenderness present at:  gastroc/soleus (intermuscular septum, medial compartment restrition s) and achilles tendon (at soleus junction tender)  MOBILITY TESTING:   Talocrural Left: hypomobile        FUNCTIONAL TESTS:     Quad:  Single Leg Squat Left: Control is decreased hip/trunk flexion, excessive anterior knee excursion and moderate loss of control.  Single Leg Squat Right: Control is decreased hip/trunk flexion and mild loss of control    General   ROS    Assessment/Plan:    Patient is a 56 year old male with L lower leg complaints.    Patient has the following significant findings with corresponding treatment plan.                Diagnosis 1:  L calf pain   Pain -  hot/cold therapy, manual therapy and self management  Decreased ROM/flexibility - manual therapy and therapeutic exercise  Decreased joint mobility - manual therapy and therapeutic exercise  Impaired gait - gait training  Impaired muscle performance - neuro re-education  Decreased function - therapeutic activities    Therapy Evaluation Codes:   1) History comprised of:   Personal factors that impact the plan of care:      None.    Comorbidity factors that impact the plan of care are:      None.     Medications impacting care: None.  2) Examination of Body Systems comprised of:   Body structures and functions that impact the plan of care:      Ankle, Hip and Lumbar spine.   Activity limitations that impact the plan of care are:      Jumping, Lifting, Running, Sports, Squatting/kneeling, Stairs and Walking.  3) Clinical presentation characteristics are:   Stable/Uncomplicated.  4) Decision-Making    Low complexity using standardized patient assessment instrument and/or measureable assessment of functional outcome.  Cumulative Therapy Evaluation is: Low  complexity.    Re: Miguel Moya   :   1965    Communication ability:  Patient appears to be able to clearly communicate and understand verbal and written communication and follow directions correctly.  Treatment Explanation - The following has been discussed with the patient:   RX ordered/plan of care  Anticipated outcomes  Possible risks and side effects  This patient would benefit from PT intervention to resume normal activities.   Rehab potential is excellent.  Frequency:  1 X week, once daily  Duration:  for 6 weeks  Discharge Plan:  Achieve all LTG.  Independent in home treatment program.  Reach maximal therapeutic benefit.    Thank you for your referral.    INQUIRIES  Therapist: Anahi Hardy, PT, ATC   [facility] [address] [address]  Phone: [phone]  Fax: [phone]

## 2022-08-22 NOTE — PROGRESS NOTES
Physical Therapy Initial Evaluation  Subjective:    Patient Health History             Pertinent medical history includes: implanted device, numbness/tingling and thyroid problems.   Red flags:  Calf pain-swelling-warmth.  Medical allergies: adhesives (many topical items).   Surgeries include:  Orthopedic surgery (2 hernias, left meniscus).    Current medications:  Thyroid medication (Nasal inhaler for allergy).    Current occupation is Finance.   Primary job tasks include:  Computer work.                                    Objective:  System    Physical Exam    General     ROS    Assessment/Plan:

## 2022-08-24 ENCOUNTER — THERAPY VISIT (OUTPATIENT)
Dept: PHYSICAL THERAPY | Facility: CLINIC | Age: 57
End: 2022-08-24
Attending: PHYSICIAN ASSISTANT
Payer: COMMERCIAL

## 2022-08-24 DIAGNOSIS — M79.662 PAIN OF LEFT CALF: Primary | ICD-10-CM

## 2022-08-24 PROCEDURE — 97140 MANUAL THERAPY 1/> REGIONS: CPT | Mod: GP | Performed by: PHYSICAL THERAPIST

## 2022-08-24 PROCEDURE — 97110 THERAPEUTIC EXERCISES: CPT | Mod: GP | Performed by: PHYSICAL THERAPIST

## 2022-08-31 ENCOUNTER — THERAPY VISIT (OUTPATIENT)
Dept: PHYSICAL THERAPY | Facility: CLINIC | Age: 57
End: 2022-08-31
Payer: COMMERCIAL

## 2022-08-31 DIAGNOSIS — M79.662 PAIN OF LEFT CALF: Primary | ICD-10-CM

## 2022-08-31 PROCEDURE — 97140 MANUAL THERAPY 1/> REGIONS: CPT | Mod: GP | Performed by: PHYSICAL THERAPIST

## 2022-08-31 PROCEDURE — 97110 THERAPEUTIC EXERCISES: CPT | Mod: GP | Performed by: PHYSICAL THERAPIST

## 2022-09-07 ENCOUNTER — THERAPY VISIT (OUTPATIENT)
Dept: PHYSICAL THERAPY | Facility: CLINIC | Age: 57
End: 2022-09-07
Payer: COMMERCIAL

## 2022-09-07 DIAGNOSIS — M79.662 PAIN OF LEFT CALF: Primary | ICD-10-CM

## 2022-09-07 PROCEDURE — 97110 THERAPEUTIC EXERCISES: CPT | Mod: GP | Performed by: PHYSICAL THERAPIST

## 2022-09-07 PROCEDURE — 97140 MANUAL THERAPY 1/> REGIONS: CPT | Mod: GP | Performed by: PHYSICAL THERAPIST

## 2022-09-07 PROCEDURE — 97035 APP MDLTY 1+ULTRASOUND EA 15: CPT | Mod: GP | Performed by: PHYSICAL THERAPIST

## 2022-09-21 ENCOUNTER — THERAPY VISIT (OUTPATIENT)
Dept: PHYSICAL THERAPY | Facility: CLINIC | Age: 57
End: 2022-09-21
Payer: COMMERCIAL

## 2022-09-21 DIAGNOSIS — M79.662 PAIN OF LEFT CALF: Primary | ICD-10-CM

## 2022-09-21 PROCEDURE — 97140 MANUAL THERAPY 1/> REGIONS: CPT | Mod: GP | Performed by: PHYSICAL THERAPIST

## 2022-09-21 PROCEDURE — 97110 THERAPEUTIC EXERCISES: CPT | Mod: GP | Performed by: PHYSICAL THERAPIST

## 2022-10-29 ENCOUNTER — HEALTH MAINTENANCE LETTER (OUTPATIENT)
Age: 57
End: 2022-10-29

## 2023-04-26 NOTE — PROGRESS NOTES
Discharge Note    Progress reporting period is from initial eval/last PN to Sep 7, 2022.     Patient seen for 4 visits.    SUBJECTIVE  Subjective changes noted by patient:  Pt has some variable LS stiffness in AM and with FB, improves with motion, L calf has done well with slowly increase run activity, bike continues to be ok for calf at current level of intensity of pedaling, He has not pushed the pace.  .  Changes in function:  Yes (See Goal flowsheet attached for changes in current functional level)  Adverse reaction to treatment or activity: None    OBJECTIVE  Changes noted in objective findings: PROM DF min loss L today, improved mobiltiy and less tenderness in FHL today, LS ext mod loss with SI area restirction R     ASSESSMENT/PLAN  Diagnosis: L calf pain   DIAGP:  The encounter diagnosis was Pain of left calf.  Updated problem list and treatment plan:   Decreased strength - HEP  STG/LTGs have been met or progress has been made towards goals:  Yes, please see goal flowsheet for most current information  Assessment of Progress: current status is unknown.    Last current status:     Self Management Plans:  HEP  I have re-evaluated this patient and find that the nature, scope, duration and intensity of the therapy is appropriate for the medical condition of the patient.  Miguel continues to require the following intervention to meet STG and LTG's:  HEP.    Recommendations:  Discharge with current home program.  Patient to follow up with MD as needed.    Please refer to the daily flowsheet for treatment today, total treatment time and time spent performing 1:1 timed codes.

## 2023-06-01 ENCOUNTER — HEALTH MAINTENANCE LETTER (OUTPATIENT)
Age: 58
End: 2023-06-01

## 2024-06-09 ENCOUNTER — HEALTH MAINTENANCE LETTER (OUTPATIENT)
Age: 59
End: 2024-06-09

## 2025-06-15 ENCOUNTER — HEALTH MAINTENANCE LETTER (OUTPATIENT)
Age: 60
End: 2025-06-15

## (undated) RX ORDER — FENTANYL CITRATE 50 UG/ML
INJECTION, SOLUTION INTRAMUSCULAR; INTRAVENOUS
Status: DISPENSED
Start: 2020-08-28